# Patient Record
Sex: MALE | Race: WHITE | NOT HISPANIC OR LATINO | URBAN - METROPOLITAN AREA
[De-identification: names, ages, dates, MRNs, and addresses within clinical notes are randomized per-mention and may not be internally consistent; named-entity substitution may affect disease eponyms.]

---

## 2017-02-13 ENCOUNTER — EMERGENCY (EMERGENCY)
Facility: HOSPITAL | Age: 37
LOS: 0 days | Discharge: HOME | End: 2017-02-13
Attending: EMERGENCY MEDICINE | Admitting: EMERGENCY MEDICINE
Payer: COMMERCIAL

## 2017-02-13 VITALS
HEART RATE: 75 BPM | RESPIRATION RATE: 15 BRPM | SYSTOLIC BLOOD PRESSURE: 113 MMHG | TEMPERATURE: 99 F | DIASTOLIC BLOOD PRESSURE: 61 MMHG | OXYGEN SATURATION: 100 %

## 2017-02-13 VITALS — WEIGHT: 199.96 LBS

## 2017-02-13 DIAGNOSIS — M79.1 MYALGIA: ICD-10-CM

## 2017-02-13 DIAGNOSIS — R09.89 OTHER SPECIFIED SYMPTOMS AND SIGNS INVOLVING THE CIRCULATORY AND RESPIRATORY SYSTEMS: ICD-10-CM

## 2017-02-13 LAB
ALBUMIN SERPL ELPH-MCNC: 3.9 G/DL — SIGNIFICANT CHANGE UP (ref 3.3–5)
ALP SERPL-CCNC: 56 U/L — SIGNIFICANT CHANGE UP (ref 40–120)
ALT FLD-CCNC: 23 U/L — SIGNIFICANT CHANGE UP (ref 12–78)
ANION GAP SERPL CALC-SCNC: 6 MMOL/L — SIGNIFICANT CHANGE UP (ref 5–17)
AST SERPL-CCNC: 12 U/L — LOW (ref 15–37)
BASOPHILS # BLD AUTO: 0.1 K/UL — SIGNIFICANT CHANGE UP (ref 0–0.2)
BASOPHILS NFR BLD AUTO: 0.6 % — SIGNIFICANT CHANGE UP (ref 0–2)
BILIRUB SERPL-MCNC: 1.6 MG/DL — HIGH (ref 0.2–1.2)
BUN SERPL-MCNC: 13 MG/DL — SIGNIFICANT CHANGE UP (ref 7–23)
CALCIUM SERPL-MCNC: 8.8 MG/DL — SIGNIFICANT CHANGE UP (ref 8.5–10.1)
CHLORIDE SERPL-SCNC: 102 MMOL/L — SIGNIFICANT CHANGE UP (ref 96–108)
CO2 SERPL-SCNC: 29 MMOL/L — SIGNIFICANT CHANGE UP (ref 22–31)
CREAT SERPL-MCNC: 1.2 MG/DL — SIGNIFICANT CHANGE UP (ref 0.5–1.3)
EOSINOPHIL # BLD AUTO: 0 K/UL — SIGNIFICANT CHANGE UP (ref 0–0.5)
EOSINOPHIL NFR BLD AUTO: 0.3 % — SIGNIFICANT CHANGE UP (ref 0–6)
GLUCOSE SERPL-MCNC: 89 MG/DL — SIGNIFICANT CHANGE UP (ref 70–99)
HCT VFR BLD CALC: 41.7 % — SIGNIFICANT CHANGE UP (ref 39–50)
HGB BLD-MCNC: 15 G/DL — SIGNIFICANT CHANGE UP (ref 13–17)
LYMPHOCYTES # BLD AUTO: 17.1 % — SIGNIFICANT CHANGE UP (ref 13–44)
LYMPHOCYTES # BLD AUTO: 2.5 K/UL — SIGNIFICANT CHANGE UP (ref 1–3.3)
MCHC RBC-ENTMCNC: 32.1 PG — SIGNIFICANT CHANGE UP (ref 27–34)
MCHC RBC-ENTMCNC: 36.1 GM/DL — HIGH (ref 32–36)
MCV RBC AUTO: 89 FL — SIGNIFICANT CHANGE UP (ref 80–100)
MONOCYTES # BLD AUTO: 1 K/UL — HIGH (ref 0–0.9)
MONOCYTES NFR BLD AUTO: 7.3 % — SIGNIFICANT CHANGE UP (ref 2–14)
NEUTROPHILS # BLD AUTO: 10.7 K/UL — HIGH (ref 1.8–7.4)
NEUTROPHILS NFR BLD AUTO: 74.7 % — SIGNIFICANT CHANGE UP (ref 43–77)
PLATELET # BLD AUTO: 246 K/UL — SIGNIFICANT CHANGE UP (ref 150–400)
POTASSIUM SERPL-MCNC: 3.9 MMOL/L — SIGNIFICANT CHANGE UP (ref 3.5–5.3)
POTASSIUM SERPL-SCNC: 3.9 MMOL/L — SIGNIFICANT CHANGE UP (ref 3.5–5.3)
PROT SERPL-MCNC: 7.5 GM/DL — SIGNIFICANT CHANGE UP (ref 6–8.3)
RBC # BLD: 4.69 M/UL — SIGNIFICANT CHANGE UP (ref 4.2–5.8)
RBC # FLD: 10.8 % — SIGNIFICANT CHANGE UP (ref 10.3–14.5)
SODIUM SERPL-SCNC: 137 MMOL/L — SIGNIFICANT CHANGE UP (ref 135–145)
WBC # BLD: 14.3 K/UL — HIGH (ref 3.8–10.5)
WBC # FLD AUTO: 14.3 K/UL — HIGH (ref 3.8–10.5)

## 2017-02-13 PROCEDURE — 99284 EMERGENCY DEPT VISIT MOD MDM: CPT

## 2017-02-13 PROCEDURE — 71020: CPT | Mod: 26

## 2017-02-13 RX ORDER — SODIUM CHLORIDE 9 MG/ML
1000 INJECTION INTRAMUSCULAR; INTRAVENOUS; SUBCUTANEOUS
Qty: 0 | Refills: 0 | Status: DISCONTINUED | OUTPATIENT
Start: 2017-02-13 | End: 2017-02-13

## 2017-02-13 RX ORDER — AZITHROMYCIN 500 MG/1
1 TABLET, FILM COATED ORAL
Qty: 4 | Refills: 0 | OUTPATIENT
Start: 2017-02-13 | End: 2017-02-17

## 2017-02-13 RX ORDER — SODIUM CHLORIDE 9 MG/ML
1000 INJECTION INTRAMUSCULAR; INTRAVENOUS; SUBCUTANEOUS ONCE
Qty: 0 | Refills: 0 | Status: COMPLETED | OUTPATIENT
Start: 2017-02-13 | End: 2017-02-13

## 2017-02-13 RX ORDER — AZITHROMYCIN 500 MG/1
500 TABLET, FILM COATED ORAL ONCE
Qty: 0 | Refills: 0 | Status: COMPLETED | OUTPATIENT
Start: 2017-02-13 | End: 2017-02-13

## 2017-02-13 RX ADMIN — SODIUM CHLORIDE 1000 MILLILITER(S): 9 INJECTION INTRAMUSCULAR; INTRAVENOUS; SUBCUTANEOUS at 17:19

## 2017-02-13 RX ADMIN — AZITHROMYCIN 500 MILLIGRAM(S): 500 TABLET, FILM COATED ORAL at 17:18

## 2017-02-13 NOTE — ED STATDOCS - PROGRESS NOTE DETAILS
male presents to ED with fever chills muscle aches and joint pain onset The history, relevant review of systems, past medical and surgical history, medical decision making, and physical examination was documented by the scribe in my presence and I attest to the accuracy of the documentation.

## 2017-02-13 NOTE — ED STATDOCS - ATTENDING CONTRIBUTION TO CARE
I personally saw the patient with the NP, and completed the key components of the history and physical exam. I then discussed the management plan with the NP.

## 2017-02-13 NOTE — ED STATDOCS - OBJECTIVE STATEMENT
35 y/o male with no PMHx, c/o fevers, chills and sweats over the passed few days. Fevers tmax 102.5. Pt has been placed on tamiflu and amoxicillin. Pt reports he has been coughing up dark greek sputum today. Non-smoker. Pt smokes marijuana on occasion.

## 2017-04-17 ENCOUNTER — EMERGENCY (EMERGENCY)
Facility: HOSPITAL | Age: 37
LOS: 0 days | Discharge: HOME | End: 2017-04-18
Attending: EMERGENCY MEDICINE | Admitting: EMERGENCY MEDICINE
Payer: COMMERCIAL

## 2017-04-17 VITALS
RESPIRATION RATE: 16 BRPM | HEART RATE: 65 BPM | SYSTOLIC BLOOD PRESSURE: 128 MMHG | DIASTOLIC BLOOD PRESSURE: 73 MMHG | TEMPERATURE: 99 F | OXYGEN SATURATION: 98 %

## 2017-04-17 VITALS — HEIGHT: 74 IN | WEIGHT: 199.96 LBS

## 2017-04-17 LAB
ALBUMIN SERPL ELPH-MCNC: 4 G/DL — SIGNIFICANT CHANGE UP (ref 3.3–5)
ALP SERPL-CCNC: 60 U/L — SIGNIFICANT CHANGE UP (ref 40–120)
ALT FLD-CCNC: 40 U/L — SIGNIFICANT CHANGE UP (ref 12–78)
ANION GAP SERPL CALC-SCNC: 4 MMOL/L — LOW (ref 5–17)
APPEARANCE UR: CLEAR — SIGNIFICANT CHANGE UP
AST SERPL-CCNC: 23 U/L — SIGNIFICANT CHANGE UP (ref 15–37)
BASOPHILS # BLD AUTO: 0.1 K/UL — SIGNIFICANT CHANGE UP (ref 0–0.2)
BASOPHILS NFR BLD AUTO: 1.3 % — SIGNIFICANT CHANGE UP (ref 0–2)
BILIRUB SERPL-MCNC: 0.7 MG/DL — SIGNIFICANT CHANGE UP (ref 0.2–1.2)
BILIRUB UR-MCNC: NEGATIVE — SIGNIFICANT CHANGE UP
BUN SERPL-MCNC: 18 MG/DL — SIGNIFICANT CHANGE UP (ref 7–23)
CALCIUM SERPL-MCNC: 8.9 MG/DL — SIGNIFICANT CHANGE UP (ref 8.5–10.1)
CHLORIDE SERPL-SCNC: 108 MMOL/L — SIGNIFICANT CHANGE UP (ref 96–108)
CO2 SERPL-SCNC: 30 MMOL/L — SIGNIFICANT CHANGE UP (ref 22–31)
COLOR SPEC: YELLOW — SIGNIFICANT CHANGE UP
CREAT SERPL-MCNC: 1.32 MG/DL — HIGH (ref 0.5–1.3)
DIFF PNL FLD: NEGATIVE — SIGNIFICANT CHANGE UP
EOSINOPHIL # BLD AUTO: 0.4 K/UL — SIGNIFICANT CHANGE UP (ref 0–0.5)
EOSINOPHIL NFR BLD AUTO: 4.3 % — SIGNIFICANT CHANGE UP (ref 0–6)
GLUCOSE SERPL-MCNC: 84 MG/DL — SIGNIFICANT CHANGE UP (ref 70–99)
GLUCOSE UR QL: NEGATIVE MG/DL — SIGNIFICANT CHANGE UP
HCT VFR BLD CALC: 45.3 % — SIGNIFICANT CHANGE UP (ref 39–50)
HGB BLD-MCNC: 14.8 G/DL — SIGNIFICANT CHANGE UP (ref 13–17)
KETONES UR-MCNC: NEGATIVE — SIGNIFICANT CHANGE UP
LEUKOCYTE ESTERASE UR-ACNC: NEGATIVE — SIGNIFICANT CHANGE UP
LIDOCAIN IGE QN: 305 U/L — SIGNIFICANT CHANGE UP (ref 73–393)
LYMPHOCYTES # BLD AUTO: 3.2 K/UL — SIGNIFICANT CHANGE UP (ref 1–3.3)
LYMPHOCYTES # BLD AUTO: 38.8 % — SIGNIFICANT CHANGE UP (ref 13–44)
MCHC RBC-ENTMCNC: 29.5 PG — SIGNIFICANT CHANGE UP (ref 27–34)
MCHC RBC-ENTMCNC: 32.6 GM/DL — SIGNIFICANT CHANGE UP (ref 32–36)
MCV RBC AUTO: 90.6 FL — SIGNIFICANT CHANGE UP (ref 80–100)
MONOCYTES # BLD AUTO: 0.5 K/UL — SIGNIFICANT CHANGE UP (ref 0–0.9)
MONOCYTES NFR BLD AUTO: 6.4 % — SIGNIFICANT CHANGE UP (ref 2–14)
NEUTROPHILS # BLD AUTO: 4.1 K/UL — SIGNIFICANT CHANGE UP (ref 1.8–7.4)
NEUTROPHILS NFR BLD AUTO: 49.3 % — SIGNIFICANT CHANGE UP (ref 43–77)
NITRITE UR-MCNC: NEGATIVE — SIGNIFICANT CHANGE UP
PH UR: 7 — SIGNIFICANT CHANGE UP (ref 4.8–8)
PLATELET # BLD AUTO: 267 K/UL — SIGNIFICANT CHANGE UP (ref 150–400)
POTASSIUM SERPL-MCNC: 4.1 MMOL/L — SIGNIFICANT CHANGE UP (ref 3.5–5.3)
POTASSIUM SERPL-SCNC: 4.1 MMOL/L — SIGNIFICANT CHANGE UP (ref 3.5–5.3)
PROT SERPL-MCNC: 7 GM/DL — SIGNIFICANT CHANGE UP (ref 6–8.3)
PROT UR-MCNC: NEGATIVE MG/DL — SIGNIFICANT CHANGE UP
RBC # BLD: 5 M/UL — SIGNIFICANT CHANGE UP (ref 4.2–5.8)
RBC # FLD: 11.6 % — SIGNIFICANT CHANGE UP (ref 10.3–14.5)
SODIUM SERPL-SCNC: 142 MMOL/L — SIGNIFICANT CHANGE UP (ref 135–145)
SP GR SPEC: 1.01 — SIGNIFICANT CHANGE UP (ref 1.01–1.02)
UROBILINOGEN FLD QL: NEGATIVE MG/DL — SIGNIFICANT CHANGE UP
WBC # BLD: 8.3 K/UL — SIGNIFICANT CHANGE UP (ref 3.8–10.5)
WBC # FLD AUTO: 8.3 K/UL — SIGNIFICANT CHANGE UP (ref 3.8–10.5)

## 2017-04-17 PROCEDURE — 74177 CT ABD & PELVIS W/CONTRAST: CPT | Mod: 26

## 2017-04-17 PROCEDURE — 99285 EMERGENCY DEPT VISIT HI MDM: CPT

## 2017-04-17 RX ORDER — SODIUM CHLORIDE 9 MG/ML
3 INJECTION INTRAMUSCULAR; INTRAVENOUS; SUBCUTANEOUS ONCE
Qty: 0 | Refills: 0 | Status: COMPLETED | OUTPATIENT
Start: 2017-04-17 | End: 2017-04-17

## 2017-04-17 RX ADMIN — SODIUM CHLORIDE 3 MILLILITER(S): 9 INJECTION INTRAMUSCULAR; INTRAVENOUS; SUBCUTANEOUS at 22:00

## 2017-04-17 NOTE — ED STATDOCS - ATTENDING CONTRIBUTION TO CARE
I, Charley Clark, performed the initial face to face bedside interview with this patient regarding history of present illness, review of symptoms and relevant past medical, social and family history.  I completed an independent physical examination.  I was the initial provider who evaluated this patient. I have signed out the follow up of any pending tests (i.e. labs, radiological studies) to the ACP with instructions to review any pertinent findings to me prior to determining a final disposition.  I have communicated the patient’s plan of care and disposition with the ACP.  The history, relevant review of systems, past medical and surgical history, medical decision making, and physical examination was documented by the scribe in my presence and I attest to the accuracy of the documentation.

## 2017-04-17 NOTE — ED STATDOCS - OBJECTIVE STATEMENT
37 y/o M presents to the ED c/o abd pain. The pt provides that he has been having intermittent LLQ pain since 3 days and has become constant today. No h/o fever, chills, dizziness, headache, nvd, cp, cough, sob, or dysuria.

## 2017-04-17 NOTE — ED STATDOCS - PROGRESS NOTE DETAILS
BLESSING Green:   Patient has been seen, evaluated and orders have been written by the attending in intake. Patient is stable.  I will follow up the results of orders written and I will continue to evaluate/observe the patient.  Adali Green PA-C BLESSING Green:   Patient has been seen, evaluated and orders have been written by the attending in intake. Patient is stable.  I will follow up the results of orders written and I will continue to evaluate/observe the patient.  Adali Green PA-C  Awaiting for UA to order CT. Pt. reported ot be eating dinner in treatment area.  Pt. told to stop eating.  Adali Green PA-C spoke with radiollogist pt with epiploic appendagitis pt wants to leave, pt encouraged to stay

## 2017-04-17 NOTE — ED STATDOCS - CHPI ED SYMPTOM NEG
no palpitations/no abdominal distention/no dysuria/no blood in stool/no vomiting/no diarrhea/no burning urination/no nausea/no fever/no hematuria

## 2017-04-17 NOTE — ED STATDOCS - NS ED MD SCRIBE ATTENDING SCRIBE SECTIONS
PAST MEDICAL/SURGICAL/SOCIAL HISTORY/RESULTS/REVIEW OF SYSTEMS/DISPOSITION/INTAKE ASSESSMENT/SCREENINGS/PHYSICAL EXAM/CONSULTATIONS/SHIFT CHANGE/HISTORY OF PRESENT ILLNESS/HIV/PROGRESS NOTE

## 2017-04-17 NOTE — ED ADULT NURSE REASSESSMENT NOTE - NS ED NURSE REASSESS COMMENT FT1
Pt is a 37 y/o A&O x 4 male presents to ED c/o LLQ abd pain, denies nausea, vomiting, fever or chills. Appears in NAD. SL initiated, labs drawn. Urine specimen obtained. Will continue to monitor.

## 2017-04-19 DIAGNOSIS — R10.32 LEFT LOWER QUADRANT PAIN: ICD-10-CM

## 2017-04-19 DIAGNOSIS — K63.89 OTHER SPECIFIED DISEASES OF INTESTINE: ICD-10-CM

## 2017-05-23 ENCOUNTER — TRANSCRIPTION ENCOUNTER (OUTPATIENT)
Age: 37
End: 2017-05-23

## 2017-05-31 PROBLEM — Z00.00 ENCOUNTER FOR PREVENTIVE HEALTH EXAMINATION: Noted: 2017-05-31

## 2017-06-01 ENCOUNTER — APPOINTMENT (OUTPATIENT)
Dept: ORTHOPEDIC SURGERY | Facility: CLINIC | Age: 37
End: 2017-06-01

## 2017-06-01 VITALS — BODY MASS INDEX: 27.09 KG/M2 | HEIGHT: 72 IN | WEIGHT: 200 LBS

## 2017-06-01 DIAGNOSIS — Z82.49 FAMILY HISTORY OF ISCHEMIC HEART DISEASE AND OTHER DISEASES OF THE CIRCULATORY SYSTEM: ICD-10-CM

## 2017-06-01 DIAGNOSIS — Z78.9 OTHER SPECIFIED HEALTH STATUS: ICD-10-CM

## 2017-06-01 DIAGNOSIS — Z83.3 FAMILY HISTORY OF DIABETES MELLITUS: ICD-10-CM

## 2017-06-01 RX ORDER — DIAZEPAM 5 MG/1
TABLET ORAL
Refills: 0 | Status: ACTIVE | COMMUNITY

## 2017-07-11 ENCOUNTER — APPOINTMENT (OUTPATIENT)
Dept: ORTHOPEDIC SURGERY | Facility: CLINIC | Age: 37
End: 2017-07-11

## 2017-08-28 ENCOUNTER — OTHER (OUTPATIENT)
Age: 37
End: 2017-08-28

## 2017-09-08 ENCOUNTER — APPOINTMENT (OUTPATIENT)
Dept: ORTHOPEDIC SURGERY | Facility: CLINIC | Age: 37
End: 2017-09-08
Payer: COMMERCIAL

## 2017-09-08 PROCEDURE — 73140 X-RAY EXAM OF FINGER(S): CPT | Mod: FA

## 2017-09-08 PROCEDURE — 99214 OFFICE O/P EST MOD 30 MIN: CPT

## 2018-02-13 ENCOUNTER — TRANSCRIPTION ENCOUNTER (OUTPATIENT)
Age: 38
End: 2018-02-13

## 2018-05-17 ENCOUNTER — OTHER (OUTPATIENT)
Age: 38
End: 2018-05-17

## 2018-06-05 ENCOUNTER — APPOINTMENT (OUTPATIENT)
Dept: ORTHOPEDIC SURGERY | Facility: CLINIC | Age: 38
End: 2018-06-05
Payer: COMMERCIAL

## 2018-06-05 VITALS — HEIGHT: 74 IN | WEIGHT: 200 LBS | RESPIRATION RATE: 16 BRPM | BODY MASS INDEX: 25.67 KG/M2

## 2018-06-05 DIAGNOSIS — M79.644 PAIN IN RIGHT FINGER(S): ICD-10-CM

## 2018-06-05 DIAGNOSIS — M20.012 MALLET FINGER OF LEFT FINGER(S): ICD-10-CM

## 2018-06-05 PROCEDURE — 20600 DRAIN/INJ JOINT/BURSA W/O US: CPT | Mod: LT

## 2018-06-05 PROCEDURE — 99214 OFFICE O/P EST MOD 30 MIN: CPT | Mod: 25

## 2019-07-14 ENCOUNTER — TRANSCRIPTION ENCOUNTER (OUTPATIENT)
Age: 39
End: 2019-07-14

## 2020-02-01 ENCOUNTER — TRANSCRIPTION ENCOUNTER (OUTPATIENT)
Age: 40
End: 2020-02-01

## 2020-08-26 ENCOUNTER — EMERGENCY (EMERGENCY)
Facility: HOSPITAL | Age: 40
LOS: 0 days | Discharge: HOME | End: 2020-08-27
Attending: EMERGENCY MEDICINE
Payer: COMMERCIAL

## 2020-08-26 VITALS — WEIGHT: 199.96 LBS | HEIGHT: 74 IN

## 2020-08-26 VITALS
SYSTOLIC BLOOD PRESSURE: 121 MMHG | RESPIRATION RATE: 14 BRPM | OXYGEN SATURATION: 97 % | DIASTOLIC BLOOD PRESSURE: 81 MMHG | HEART RATE: 65 BPM | TEMPERATURE: 98 F

## 2020-08-26 DIAGNOSIS — R51 HEADACHE: ICD-10-CM

## 2020-08-26 PROCEDURE — 99284 EMERGENCY DEPT VISIT MOD MDM: CPT | Mod: 25

## 2020-08-26 PROCEDURE — 70450 CT HEAD/BRAIN W/O DYE: CPT | Mod: 26

## 2020-08-26 PROCEDURE — 93010 ELECTROCARDIOGRAM REPORT: CPT

## 2020-08-26 PROCEDURE — 70450 CT HEAD/BRAIN W/O DYE: CPT

## 2020-08-26 PROCEDURE — 93005 ELECTROCARDIOGRAM TRACING: CPT

## 2020-08-26 PROCEDURE — 99283 EMERGENCY DEPT VISIT LOW MDM: CPT

## 2020-08-26 NOTE — ED ADULT TRIAGE NOTE - CHIEF COMPLAINT QUOTE
Pt presents to ED c/o pain to back of head. Pt states he woke up with pain, denies injury to area. Denies blurry vision, lightheadedness, dizziness, chest pain, sob. PELLETIER 0

## 2020-08-26 NOTE — ED STATDOCS - ATTENDING CONTRIBUTION TO CARE
Attending Contribution to Care: I, Charley Barnes, performed the initial face to face bedside interview with this patient regarding history of present illness, review of symptoms and relevant past medical, social and family history.  I completed an independent physical examination.  I was the initial provider who evaluated this patient and the history, physical, and MDM reflect this intial assessment. I have signed out the follow up of any pending tests after the original (i.e. labs, radiological studies) to the ACP with instructions to review any with instructions to review any concerning findings to me prior to discharge.  I have communicated the patient’s plan of care and disposition with the ACP.

## 2020-08-26 NOTE — ED STATDOCS - PHYSICAL EXAMINATION
PA NOTE: GEN: AOX3, NAD. HEENT: Throat clear. Airway is patent. EYES: PERRLA. EOMI. Head: NC/AT. NECK: Supple, No JVD. FROM. C-spine non-tender. CV:S1S2, RRR, LUNGS: Non-labored breathing, no tachypnea. O2sat 100% RA. CTA b/l. No w/r/r. CHEST: Equal chest expansion and rise. No deformity. ABD: Soft, NT/ND, no rebound, no guarding. No CVAT. EXT: No e/c/c. 2+ distal pulses. SKIN: No rashes. NEURO: No focal deficits. CN II-XII intact. FROM. 5/5 motor and sensory. ~Christiano Reyes PA-C

## 2020-08-26 NOTE — ED STATDOCS - PATIENT PORTAL LINK FT
You can access the FollowMyHealth Patient Portal offered by [facility] by registering at the following website: [email or web address]. By joining [employer]’s FollowMyHealth portal, you will also be able to view your health information using other applications (apps) compatible with our system.

## 2020-08-26 NOTE — ED STATDOCS - CLINICAL SUMMARY MEDICAL DECISION MAKING FREE TEXT BOX
CT head NEG. PA note: CT result reviewed in details with patient. Patient re-examined and re-evaluated. Patient feels much better at this time. ED evaluation, Diagnosis and management discussed with the patient in detail. Workup results discussed with ED attending CANDIDO Matos to [place] home. Close PMD follow up encouraged.  Strict ED return instructions discussed in detail and patient given the opportunity to ask any questions about their discharge diagnosis and instructions. Patient verbalized understanding. ~ Christiano Reyes PA-C

## 2020-08-26 NOTE — ED STATDOCS - NSFOLLOWUPINSTRUCTIONS_ED_ALL_ED_FT
General Headache Without Cause  A headache is pain or discomfort felt around the head or neck area. The specific cause of a headache may not be found. There are many causes and types of headaches. A few common ones are:  Tension headaches.Migraine headaches.Cluster headaches.Chronic daily headaches.Follow these instructions at home:  Watch your condition for any changes. Let your health care provider know about them. Take these steps to help with your condition:  Managing pain         Take over-the-counter and prescription medicines only as told by your health care provider.Lie down in a dark, quiet room when you have a headache.If directed, put ice on your head and neck area:  Put ice in a plastic bag.Place a towel between your skin and the bag.Leave the ice on for 20 minutes, 2–3 times per day.If directed, apply heat to the affected area. Use the heat source that your health care provider recommends, such as a moist heat pack or a heating pad.  Place a towel between your skin and the heat source.Leave the heat on for 20–30 minutes.Remove the heat if your skin turns bright red. This is especially important if you are unable to feel pain, heat, or cold. You may have a greater risk of getting burned.Keep lights dim if bright lights bother you or make your headaches worse.Eating and drinking     Eat meals on a regular schedule.If you drink alcohol:  Limit how much you use to:   0–1 drink a day for women. 0–2 drinks a day for men. Be aware of how much alcohol is in your drink. In the [place] one drink equals one 12 oz bottle of beer (355 mL), one 5 oz glass of wine (148 mL), or one 1½ oz glass of hard liquor (44 mL).Stop drinking caffeine, or decrease the amount of caffeine you drink.General instructions        Keep a headache journal to help find out what may trigger your headaches. For example, write down:  What you eat and drink.How much sleep you get.Any change to your diet or medicines.Try massage or other relaxation techniques.Limit stress.Sit up straight, and do not tense your muscles.Do not use any products that contain nicotine or tobacco, such as cigarettes, e-cigarettes, and chewing tobacco. If you need help quitting, ask your health care provider.Exercise regularly as told by your health care provider.Sleep on a regular schedule. Get 7–9 hours of sleep each night, or the amount recommended by your health care provider.Keep all follow-up visits as told by your health care provider. This is important.Contact a health care provider if:  Your symptoms are not helped by medicine.You have a headache that is different from the usual headache.You have nausea or you vomit.You have a fever.Get help right away if:  Your headache becomes severe quickly.Your headache gets worse after moderate to intense physical activity.You have repeated vomiting.You have a stiff neck.You have a loss of vision.You have problems with speech.You have pain in the eye or ear.You have muscular weakness or loss of muscle control.You lose your balance or have trouble walking.You feel faint or pass out.You have confusion.You have a seizure.Summary  A headache is pain or discomfort felt around the head or neck area.There are many causes and types of headaches. In some cases, the cause may not be found.Keep a headache journal to help find out what may trigger your headaches. Watch your condition for any changes. Let your health care provider know about them.Contact a health care provider if you have a headache that is different from the usual headache, or if your symptoms are not helped by medicine.Get help right away if your headache becomes severe, you vomit, you have a loss of vision, you lose your balance, or you have a seizure.This information is not intended to replace advice given to you by your health care provider. Make sure you discuss any questions you have with your health care provider.

## 2020-08-26 NOTE — ED STATDOCS - CARE PROVIDER_API CALL
Chito Dempsey  NEUROLOGY  [address] [place] [address], [address] [place] [address]  Phone: [phone]  Fax: [phone]  Follow Up Time: 1-3 Days

## 2020-08-26 NOTE — ED STATDOCS - PROGRESS NOTE DETAILS
PA note: Patient is a 38 y/o male with no PMHx who presents to ED c/o waking up this morning with a headache posterior on the L side. No N/V, dizziness, visual changes. Took Advil no relief. Called PMD and told to go to ED for CT. Patient does not want any medication for HA, refusing blood tests, just wants a CT head for acute pathology. ~Christiano Reyes PA-C CT head NEG. PA note: CT result reviewed in details with patient. Patient re-examined and re-evaluated. Patient feels much better at this time. ED evaluation, Diagnosis and management discussed with the patient in detail. Workup results discussed with ED attending CANDIDO Matos to [place] home. Close PMD follow up encouraged.  Strict ED return instructions discussed in detail and patient given the opportunity to ask any questions about their discharge diagnosis and instructions. Patient verbalized understanding. ~ Christiano Reyes PA-C

## 2020-08-27 NOTE — ED ADULT NURSE NOTE - OBJECTIVE STATEMENT
Pt presents to ER c/o left sided headache. Pt woke up with Mathews this evening. Denies change in vision. Gait stable, neuro intact. AO x oriented to baseline, normal breathing pattern with no difficulty. Denies fall/injury

## 2021-08-20 ENCOUNTER — EMERGENCY (EMERGENCY)
Facility: HOSPITAL | Age: 41
LOS: 0 days | Discharge: HOME | End: 2021-08-20
Attending: EMERGENCY MEDICINE
Payer: COMMERCIAL

## 2021-08-20 VITALS — WEIGHT: 199.96 LBS | HEIGHT: 74 IN

## 2021-08-20 VITALS
OXYGEN SATURATION: 100 % | SYSTOLIC BLOOD PRESSURE: 129 MMHG | DIASTOLIC BLOOD PRESSURE: 74 MMHG | HEART RATE: 79 BPM | RESPIRATION RATE: 20 BRPM | TEMPERATURE: 98 F

## 2021-08-20 DIAGNOSIS — Z86.16 PERSONAL HISTORY OF COVID-19: ICD-10-CM

## 2021-08-20 DIAGNOSIS — R06.02 SHORTNESS OF BREATH: ICD-10-CM

## 2021-08-20 DIAGNOSIS — I26.99 OTHER PULMONARY EMBOLISM WITHOUT ACUTE COR PULMONALE: ICD-10-CM

## 2021-08-20 DIAGNOSIS — K64.4 RESIDUAL HEMORRHOIDAL SKIN TAGS: ICD-10-CM

## 2021-08-20 DIAGNOSIS — R07.9 CHEST PAIN, UNSPECIFIED: ICD-10-CM

## 2021-08-20 LAB
ALBUMIN SERPL ELPH-MCNC: 3.1 G/DL — LOW (ref 3.3–5)
ALP SERPL-CCNC: 72 U/L — SIGNIFICANT CHANGE UP (ref 40–120)
ALT FLD-CCNC: 39 U/L — SIGNIFICANT CHANGE UP (ref 12–78)
ANION GAP SERPL CALC-SCNC: 5 MMOL/L — SIGNIFICANT CHANGE UP (ref 5–17)
APTT BLD: 34.9 SEC — SIGNIFICANT CHANGE UP (ref 27.5–35.5)
AST SERPL-CCNC: 28 U/L — SIGNIFICANT CHANGE UP (ref 15–37)
BASOPHILS # BLD AUTO: 0.06 K/UL — SIGNIFICANT CHANGE UP (ref 0–0.2)
BASOPHILS NFR BLD AUTO: 0.8 % — SIGNIFICANT CHANGE UP (ref 0–2)
BILIRUB SERPL-MCNC: 0.6 MG/DL — SIGNIFICANT CHANGE UP (ref 0.2–1.2)
BUN SERPL-MCNC: 12 MG/DL — SIGNIFICANT CHANGE UP (ref 7–23)
CALCIUM SERPL-MCNC: 9 MG/DL — SIGNIFICANT CHANGE UP (ref 8.5–10.1)
CHLORIDE SERPL-SCNC: 107 MMOL/L — SIGNIFICANT CHANGE UP (ref 96–108)
CK SERPL-CCNC: 90 U/L — SIGNIFICANT CHANGE UP (ref 26–308)
CO2 SERPL-SCNC: 28 MMOL/L — SIGNIFICANT CHANGE UP (ref 22–31)
CREAT SERPL-MCNC: 1.08 MG/DL — SIGNIFICANT CHANGE UP (ref 0.5–1.3)
D DIMER BLD IA.RAPID-MCNC: 3249 NG/ML DDU — HIGH
EOSINOPHIL # BLD AUTO: 0.09 K/UL — SIGNIFICANT CHANGE UP (ref 0–0.5)
EOSINOPHIL NFR BLD AUTO: 1.1 % — SIGNIFICANT CHANGE UP (ref 0–6)
GLUCOSE SERPL-MCNC: 89 MG/DL — SIGNIFICANT CHANGE UP (ref 70–99)
HCT VFR BLD CALC: 40.5 % — SIGNIFICANT CHANGE UP (ref 39–50)
HGB BLD-MCNC: 13.7 G/DL — SIGNIFICANT CHANGE UP (ref 13–17)
IMM GRANULOCYTES NFR BLD AUTO: 0.4 % — SIGNIFICANT CHANGE UP (ref 0–1.5)
INR BLD: 1.21 RATIO — HIGH (ref 0.88–1.16)
LACTATE SERPL-SCNC: 0.9 MMOL/L — SIGNIFICANT CHANGE UP (ref 0.7–2)
LIDOCAIN IGE QN: 112 U/L — SIGNIFICANT CHANGE UP (ref 73–393)
LYMPHOCYTES # BLD AUTO: 1.55 K/UL — SIGNIFICANT CHANGE UP (ref 1–3.3)
LYMPHOCYTES # BLD AUTO: 19.6 % — SIGNIFICANT CHANGE UP (ref 13–44)
MCHC RBC-ENTMCNC: 30 PG — SIGNIFICANT CHANGE UP (ref 27–34)
MCHC RBC-ENTMCNC: 33.8 GM/DL — SIGNIFICANT CHANGE UP (ref 32–36)
MCV RBC AUTO: 88.6 FL — SIGNIFICANT CHANGE UP (ref 80–100)
MONOCYTES # BLD AUTO: 0.79 K/UL — SIGNIFICANT CHANGE UP (ref 0–0.9)
MONOCYTES NFR BLD AUTO: 10 % — SIGNIFICANT CHANGE UP (ref 2–14)
NEUTROPHILS # BLD AUTO: 5.4 K/UL — SIGNIFICANT CHANGE UP (ref 1.8–7.4)
NEUTROPHILS NFR BLD AUTO: 68.1 % — SIGNIFICANT CHANGE UP (ref 43–77)
NT-PROBNP SERPL-SCNC: 124 PG/ML — SIGNIFICANT CHANGE UP (ref 0–125)
PLATELET # BLD AUTO: 369 K/UL — SIGNIFICANT CHANGE UP (ref 150–400)
POTASSIUM SERPL-MCNC: 4.6 MMOL/L — SIGNIFICANT CHANGE UP (ref 3.5–5.3)
POTASSIUM SERPL-SCNC: 4.6 MMOL/L — SIGNIFICANT CHANGE UP (ref 3.5–5.3)
PROT SERPL-MCNC: 7.2 GM/DL — SIGNIFICANT CHANGE UP (ref 6–8.3)
PROTHROM AB SERPL-ACNC: 14 SEC — HIGH (ref 10.6–13.6)
RBC # BLD: 4.57 M/UL — SIGNIFICANT CHANGE UP (ref 4.2–5.8)
RBC # FLD: 12 % — SIGNIFICANT CHANGE UP (ref 10.3–14.5)
SODIUM SERPL-SCNC: 140 MMOL/L — SIGNIFICANT CHANGE UP (ref 135–145)
TROPONIN I SERPL-MCNC: <0.015 NG/ML — SIGNIFICANT CHANGE UP (ref 0.01–0.04)
WBC # BLD: 7.92 K/UL — SIGNIFICANT CHANGE UP (ref 3.8–10.5)
WBC # FLD AUTO: 7.92 K/UL — SIGNIFICANT CHANGE UP (ref 3.8–10.5)

## 2021-08-20 PROCEDURE — 71045 X-RAY EXAM CHEST 1 VIEW: CPT | Mod: 26

## 2021-08-20 PROCEDURE — 93971 EXTREMITY STUDY: CPT | Mod: LT

## 2021-08-20 PROCEDURE — 93005 ELECTROCARDIOGRAM TRACING: CPT

## 2021-08-20 PROCEDURE — G1004: CPT

## 2021-08-20 PROCEDURE — 99284 EMERGENCY DEPT VISIT MOD MDM: CPT | Mod: 25

## 2021-08-20 PROCEDURE — 36415 COLL VENOUS BLD VENIPUNCTURE: CPT

## 2021-08-20 PROCEDURE — 85025 COMPLETE CBC W/AUTO DIFF WBC: CPT

## 2021-08-20 PROCEDURE — 99285 EMERGENCY DEPT VISIT HI MDM: CPT

## 2021-08-20 PROCEDURE — 84145 PROCALCITONIN (PCT): CPT

## 2021-08-20 PROCEDURE — 80053 COMPREHEN METABOLIC PANEL: CPT

## 2021-08-20 PROCEDURE — 86900 BLOOD TYPING SEROLOGIC ABO: CPT

## 2021-08-20 PROCEDURE — 83605 ASSAY OF LACTIC ACID: CPT

## 2021-08-20 PROCEDURE — 83690 ASSAY OF LIPASE: CPT

## 2021-08-20 PROCEDURE — 85730 THROMBOPLASTIN TIME PARTIAL: CPT

## 2021-08-20 PROCEDURE — 84484 ASSAY OF TROPONIN QUANT: CPT

## 2021-08-20 PROCEDURE — 93010 ELECTROCARDIOGRAM REPORT: CPT

## 2021-08-20 PROCEDURE — 83880 ASSAY OF NATRIURETIC PEPTIDE: CPT

## 2021-08-20 PROCEDURE — 85610 PROTHROMBIN TIME: CPT

## 2021-08-20 PROCEDURE — 85379 FIBRIN DEGRADATION QUANT: CPT

## 2021-08-20 PROCEDURE — 93971 EXTREMITY STUDY: CPT | Mod: 26,LT

## 2021-08-20 PROCEDURE — 71045 X-RAY EXAM CHEST 1 VIEW: CPT

## 2021-08-20 PROCEDURE — 82550 ASSAY OF CK (CPK): CPT

## 2021-08-20 PROCEDURE — 71275 CT ANGIOGRAPHY CHEST: CPT | Mod: 26,ME

## 2021-08-20 PROCEDURE — 71275 CT ANGIOGRAPHY CHEST: CPT

## 2021-08-20 PROCEDURE — 86901 BLOOD TYPING SEROLOGIC RH(D): CPT

## 2021-08-20 PROCEDURE — 82728 ASSAY OF FERRITIN: CPT

## 2021-08-20 PROCEDURE — 86140 C-REACTIVE PROTEIN: CPT

## 2021-08-20 PROCEDURE — 86850 RBC ANTIBODY SCREEN: CPT

## 2021-08-20 RX ORDER — ENOXAPARIN SODIUM 100 MG/ML
90 INJECTION SUBCUTANEOUS ONCE
Refills: 0 | Status: DISCONTINUED | OUTPATIENT
Start: 2021-08-20 | End: 2021-08-20

## 2021-08-20 RX ORDER — FONDAPARINUX SODIUM 2.5 MG/.5ML
1 INJECTION, SOLUTION SUBCUTANEOUS
Qty: 42 | Refills: 0
Start: 2021-08-20 | End: 2021-09-09

## 2021-08-20 RX ORDER — RIVAROXABAN 15 MG-20MG
15 KIT ORAL ONCE
Refills: 0 | Status: COMPLETED | OUTPATIENT
Start: 2021-08-20 | End: 2021-08-20

## 2021-08-20 RX ADMIN — RIVAROXABAN 15 MILLIGRAM(S): KIT at 16:44

## 2021-08-20 NOTE — ED PROVIDER NOTE - PROGRESS NOTE DETAILS
Hugh Plunkett for attending Dr. Hayes: Rectal Exam: Small hemorrhoid nonbleeding 6 o clock position. [id number]. Expiration 2/28/22. Light brown stool. Guaiac positive. QC passed. Patient with RLL pulmonary embolism. Discussed findings with patient, although satting 99%, likely provoked PE from COVID, no other high risk factors, and PESI score low, recommended admission to hospital in light of his lower GI bleeding.  Patient declines admission at this time.  We discussed risk and benefits, including worsening disease burden, hypoxia, Gi bleed, death, and patient still requesting discharge at this time.  States that he lives 7 minutes from the hospital and if he has any complications he will immediately come back.  With this shared decision making, we will start oral anticoagulation, f/u with pulmonology, GI, with strict return precautions for hypoxia, SOB, chest pain, or GI bleed. Malcolm Hayes D.O. Had at length discussion with patient, recommending admission for anticoagulation, particularly in setting of rectal bleeding. Pt states he does not want to stay in hospital over concerns of difficulty sleeping. States he lives close to hospital and can return at any time. Also cites appointment with colorectal surgery in 5 days. Risks of bleeding discussed with patient that may result in death, still prefers to go home vs being admitted. Advised patient that in event of discharge from ED, he will be provided temporary starter dose of xarelto, must follow up with PCP to continue x 6 months. Shared decision making with patient, will discharge with return precautions including trauma, bleeding. - Sal Pal PA-C

## 2021-08-20 NOTE — ED PROVIDER NOTE - PATIENT PORTAL LINK FT
You can access the FollowMyHealth Patient Portal offered by [facility] by registering at the following website: [email or web address]. By joining [employer]’s FollowMyHealth portal, you will also be able to view your health information using other applications (apps) compatible with our system.

## 2021-08-20 NOTE — ED PROVIDER NOTE - OBJECTIVE STATEMENT
41 y/o M with PMH of COVID-19, states he tested positive 3 weeks ago, presents now with right sided chest/rib pain and sob worse over the past 3 days. States he is unable to lie down in flat position without feeling pain. Notes pain is worse with deep inspiration. Also with left calf pain. States he developed hemorrhoids during his covid infection. States over the past 2 days has had "large amounts" of bloody stool. Denies fever, chills, abdominal pain, nausea, vomiting.

## 2021-08-20 NOTE — ED PROVIDER NOTE - NSFOLLOWUPINSTRUCTIONS_ED_ALL_ED_FT
PLEASE FOLLOW UP WITH YOUR PCP WITHIN 21 DAYS TO CONTINUE WITH XARELTO. FOLLOW UP WITH GI AS PLANNED. RETURN TO ED IN EVENT OF WORSENING SYMPTOMS.   Pulmonary Embolism    WHAT YOU NEED TO KNOW:    What is a pulmonary embolism (PE)? A PE is the sudden blockage of a blood vessel in the lungs by an embolus. An embolus is a small piece of blood clot, fat, air, or tumor cells. The embolus cuts off the blood supply to your lungs. A PE can become life-threatening.    Pulmonary Embolism         What increases my risk for a PE?   •Obesity      •Smoking cigarettes      •A blood disorder that makes your blood clot faster than normal, such as factor V Leiden mutation      •Birth control pills, especially if you are older than 35 or smoke cigarettes      •Certain blood diseases, such as thrombophilia and hyperhomocysteinemia      •Medical conditions, such as a deep venous thrombosis (DVT) or cancer      •Pregnancy and childbirth      •Recent surgery      •Sitting or lying in one position for a long time, such as when you travel by plane      What are the signs and symptoms of a PE?   •Sudden shortness of breath or fast breathing      •Sudden chest pain that is worse when you take a deep breath      •Fast heartbeat      •Fever and coughing up blood      •Bluish nails      •Cold, pale, clammy skin      •Fainting      How is a PE diagnosed? Ask your healthcare provider about these and other tests you may need:   •Blood tests may show signs of the PE or how well your organs are working.      •An EKG test records your heart rhythm and how fast your heart beats. It is used to check for abnormal heart function.      •A chest x-ray may show signs of a lung infection or other damage.      •A CT scan may show the PE. You may be given contrast liquid to help your lungs show up better in the pictures. Tell the healthcare provider if you have ever had an allergic reaction to contrast liquid.       •A lung scan, or V/Q scan, may show how well blood and oxygen flow in your lungs. A small amount of contrast liquid is used to study your airflow (V) and blood flow (Q). First, you breathe in medical gas. Then, contrast liquid is injected into a vein. Pictures are taken to see how well your lungs take in oxygen.       How is a PE treated? Treatment depends on what the embolus is made of and where the PE is located in your lung. You may need any of the following:   •Clot busters are emergency medicines that work to dissolve blood clots.      •Blood thinners help prevent blood clots. Clots can cause strokes, heart attacks, and death. The following are general safety guidelines to follow while you are taking a blood thinner:?Watch for bleeding and bruising while you take blood thinners. Watch for bleeding from your gums or nose. Watch for blood in your urine and bowel movements. Use a soft washcloth on your skin, and a soft toothbrush to brush your teeth. This can keep your skin and gums from bleeding. If you shave, use an electric shaver. Do not play contact sports.       ?Tell your dentist and other healthcare providers that you take a blood thinner. Wear a bracelet or necklace that says you take this medicine.       ?Do not start or stop any other medicines unless your healthcare provider tells you to. Many medicines cannot be used with blood thinners.      ?Take your blood thinner exactly as prescribed by your healthcare provider. Do not skip does or take less than prescribed. Tell your provider right away if you forget to take your blood thinner, or if you take too much.      ?Warfarin is a blood thinner that you may need to take. The following are things you should be aware of if you take warfarin: ?Foods and medicines can affect the amount of warfarin in your blood. Do not make major changes to your diet while you take warfarin. Warfarin works best when you eat about the same amount of vitamin K every day. Vitamin K is found in green leafy vegetables and certain other foods. Ask for more information about what to eat when you are taking warfarin.      ?You will need to see your healthcare provider for follow-up visits when you are on warfarin. You will need regular blood tests. These tests are used to decide how much medicine you need.         •A vena cava filter may be placed inside your vena cava to prevent another PE. The vena cava is a large vein that brings blood from your lower body up to your heart. The filter may help trap an embolus and prevent it from going into your lungs.      •Surgery called a thrombectomy may be done to remove the PE. A procedure called thrombolysis may instead be done to inject a clot buster that helps break the clot apart.      What can I do to prevent a PE?   •Change your body position or move around often. Move and stretch in your seat several times each hour if you travel by car or work at a desk. In an airplane, get up and walk every hour.      •Exercise regularly to help increase your blood flow. Walking is a good low-impact exercise. Talk to your healthcare provider about the best exercise plan for you.   Walking for Exercise           •Maintain a healthy weight. Ask your healthcare provider how much you should weigh. Ask him or her to help you create a weight loss plan if you are overweight.      •Do not smoke. Nicotine and other chemicals in cigarettes and cigars can damage blood vessels and increase your risk for another PE. Ask your healthcare provider for information if you currently smoke and need help to quit. E-cigarettes or smokeless tobacco still contain nicotine. Talk to your healthcare provider before you use these products.      •Ask about birth control if you are a woman who takes the pill. A birth control pill increases the risk for blood clots in certain women. The risk is higher if you are also older than 35, smoke cigarettes, or have a blood clotting disorder. Talk to your healthcare provider about other ways to prevent pregnancy, such as a cervical cap or intrauterine device (IUD).      Call your local emergency number (911 in the [place]) if:   •You feel lightheaded, short of breath, and have chest pain.      •You cough up blood.      When should I call my doctor?   •Your arm or leg feels warm, tender, and painful. It may look swollen and red.      •You have questions or concerns about your condition or care.      CARE AGREEMENT:    You have the right to help plan your care. Learn about your health condition and how it may be treated. Discuss treatment options with your healthcare providers to decide what care you want to receive. You always have the right to refuse treatment.

## 2021-08-20 NOTE — ED ADULT NURSE NOTE - NS_ED_NURSE_TEACHING_TOPIC_ED_A_ED
pulmonary embolism. Patient advised of the seriousness of taking xarelto and following up with pcp. Advised that if symptoms worsen to come back

## 2021-08-20 NOTE — ED PROVIDER NOTE - NS ED ROS FT
GEN: Denies fever, chills, sick contacts, recent travel  HEENT: Denies dizziness, blurry vision, HA  Cardiac: +CP and SOB Denies palpitations  Lungs: Denies cough, wheezing  PV: +left calf pain Denies LE swelling  GI: Denies nausea, vomiting, diarrhea, constipation, flank pain  : Denies hematuria, dysuria, frequency, urgency  Skin: Denies rash, redness, open wound  MSK: Denies pain, decreased ROM  Neuro: Denies dizziness, difficulty speaking, difficulty swallowing, numbness/tingling in extremities, loss of bowel/bladder control, weakness in extremities

## 2021-08-20 NOTE — ED PROVIDER NOTE - PHYSICAL EXAMINATION
Gen: Well appearing. A&O x3.   HEENT: NC/AT. Dentition in good repair. No oropharyngeal erythema, tonsilar enlargement or exudates. Uvula midline. No submandibular or anterior cervical lymphadenopathy. TM well visualized bilaterally. Nares patent.  Cardiac: s1s2, RRR.  Lungs: CTAB, no chest wall tenderness.  Abdomen: NBS x4, soft, nontender. No CVAT.  MSK: No obvious deformity to extremities. No midline spinal tenderness or tenderness over bony landmarks on extremities. 5/5 upper and lower extremity strength. Full ROM in all extremities  Neuro: CN II-XII intact. Sensation intact to light touch in all extremities. 5/5 strength in all extremities. Romberg negative.  PV: No LE edema or calf tenderness. Distal pulses 2+ in all extremities.

## 2021-08-20 NOTE — ED STATDOCS - NS_ ATTENDINGSCRIBEDETAILS _ED_A_ED_FT
I, Janak Munoz MD,  performed the initial face to face bedside interview with this patient regarding history of present illness, review of symptoms and relevant past medical, social and family history.  I completed an independent physical examination.  I was the initial provider who evaluated this patient.  The history, relevant review of systems, past medical and surgical history, medical decision making, and physical examination was documented by the scribe in my presence and I attest to the accuracy of the documentation.

## 2021-08-20 NOTE — ED STATDOCS - PROGRESS NOTE DETAILS
Hugh Munoz : 41 y/o M with no significant PMHx presents to the ED c/o SOB. Recently diagnosed with COVID 1 month ago. Will send pt to main ED for further evaluation. Hugh Pickering for Dr. CR Munoz : 41 y/o M with no significant PMHx presents to the ED c/o SOB.Recently diagnosed with COVID 1 month ago. now with right sided cp sob  leg swelling on left as well as pain. also had large volume of blood per rectum. has had hemorrhoids before but states he passed a lot of blood. here pt hypotensive to 90 systolic. Will send pt to main ED for further evaluation.

## 2021-08-20 NOTE — ED ADULT TRIAGE NOTE - CHIEF COMPLAINT QUOTE
Pt. to the ED C/O Right sided Rib Pain with SOB and CP- Pt. reports SOB worst at lying down- Pt. reports being Covid + x 2 weeks

## 2021-08-20 NOTE — ED PROVIDER NOTE - CLINICAL SUMMARY MEDICAL DECISION MAKING FREE TEXT BOX
Pt with PE, GI bleed likely 2/2 hemorrhoid.  H/H stable.  Discussed with patient recommendation to be admitted to hospital for close observation while initially starting blood thinners given GI bleed, however patient declines.  D/c home on anticoagulation, follow up and strict return precautions given.

## 2021-08-21 LAB
CRP SERPL-MCNC: 56 MG/L — HIGH
FERRITIN SERPL-MCNC: 290 NG/ML — SIGNIFICANT CHANGE UP (ref 30–400)
PROCALCITONIN SERPL-MCNC: 0.02 NG/ML — SIGNIFICANT CHANGE UP (ref 0.02–0.1)

## 2021-09-04 ENCOUNTER — TRANSCRIPTION ENCOUNTER (OUTPATIENT)
Age: 41
End: 2021-09-04

## 2022-02-26 NOTE — ED STATDOCS - OBJECTIVE STATEMENT
Lab Results   Component Value Date    HGBA1C 7 7 (H) 09/03/2021       Recent Labs     02/25/22  1240 02/25/22  1614 02/25/22  2104 02/26/22  1115   POCGLU 127 105 84 96     Continue Lantus with sliding scale insulin 38 y/o male woke up with a headache posterior on the L side. No N/V, dizziness, visual changes. Took Advil no relief. Called PMD and told to go to ED for CT.

## 2022-02-28 ENCOUNTER — APPOINTMENT (OUTPATIENT)
Dept: ORTHOPEDIC SURGERY | Facility: CLINIC | Age: 42
End: 2022-02-28
Payer: COMMERCIAL

## 2022-02-28 VITALS — BODY MASS INDEX: 26.31 KG/M2 | HEIGHT: 74 IN | RESPIRATION RATE: 16 BRPM | WEIGHT: 205 LBS

## 2022-02-28 DIAGNOSIS — M25.542 PAIN IN JOINTS OF LEFT HAND: ICD-10-CM

## 2022-02-28 DIAGNOSIS — L90.5 SCAR CONDITIONS AND FIBROSIS OF SKIN: ICD-10-CM

## 2022-02-28 PROCEDURE — 99203 OFFICE O/P NEW LOW 30 MIN: CPT

## 2022-02-28 PROCEDURE — 73140 X-RAY EXAM OF FINGER(S): CPT | Mod: F2

## 2022-08-08 ENCOUNTER — NON-APPOINTMENT (OUTPATIENT)
Age: 42
End: 2022-08-08

## 2022-09-21 ENCOUNTER — APPOINTMENT (OUTPATIENT)
Dept: ORTHOPEDIC SURGERY | Facility: CLINIC | Age: 42
End: 2022-09-21

## 2022-09-21 VITALS — WEIGHT: 205 LBS | BODY MASS INDEX: 26.31 KG/M2 | HEIGHT: 74 IN

## 2022-09-21 PROCEDURE — 99213 OFFICE O/P EST LOW 20 MIN: CPT

## 2022-09-22 NOTE — HISTORY OF PRESENT ILLNESS
[de-identified] : 9/21/22: 41M who presents today with left bicep pain since july 2022. Pain is over distal biceps and has remained the same within the past two months. Stopped doing isolated bicep exercises within the past week along with icing which has helped. No hx of injury the bicep.

## 2022-09-22 NOTE — PHYSICAL EXAM
[Left] : left elbow [NL (150)] : flexion 150 degrees [NL (0)] : extension 0 degrees [5___] : pronation 5[unfilled]/5 [] : no laceration/abrasion

## 2022-09-22 NOTE — DISCUSSION/SUMMARY
[de-identified] : 40 y/o male with left distal bicep strain, x-ray deferred today\par \par 1) nsaids prn\par 2) cryotherapy, rest and activity modification\par 3) rest from exercise and gradual return to and increase activity and lifting as tolerated\par 4) Discussed importance of stretching/warming up prior to exercise \par \par Entered by Radha Meraz acting as scribe. \par

## 2022-09-28 ENCOUNTER — APPOINTMENT (OUTPATIENT)
Dept: ORTHOPEDIC SURGERY | Facility: CLINIC | Age: 42
End: 2022-09-28

## 2022-09-28 VITALS — HEIGHT: 74 IN | WEIGHT: 205 LBS | BODY MASS INDEX: 26.31 KG/M2

## 2022-09-28 PROCEDURE — 99213 OFFICE O/P EST LOW 20 MIN: CPT

## 2022-09-28 NOTE — HISTORY OF PRESENT ILLNESS
[de-identified] : 9/28/22: Here for left knee pain. Has hx of meniscus tear from jan of 2021, most recent MRI from Feb 2022 with complex mmt.  Saw Dr. Britton. Attended physical therapy without lasting improvement. Pain has worsened recently with episodes of locking and instability of the left knee.  Reports left knee pain most present with increased activity.\par

## 2022-09-28 NOTE — DISCUSSION/SUMMARY
[de-identified] : 41m with persistent medial left knee pain, instability and locking. Hx of Complex mmt on previous MRI, now with worsening symptoms. Has failed conservative treatment over the past 6 months including physical therapy\par 1) new MRI left knee, eval meniscal tear\par 2) cryotherapy, rest and activity modification\par 3) rtc after MRI, will discuss further treatment \par \par \par Entered by Radha Meraz acting as scribe. \par

## 2022-09-28 NOTE — PHYSICAL EXAM
[NL (150)] : flexion 150 degrees [Left] : left knee [NL (0)] : extension 0 degrees [5___] : hamstring 5[unfilled]/5 [Positive] : positive Marian [] : no extensor lag [TWNoteComboBox7] : flexion 130 degrees

## 2022-10-08 ENCOUNTER — APPOINTMENT (OUTPATIENT)
Dept: MRI IMAGING | Facility: CLINIC | Age: 42
End: 2022-10-08

## 2022-10-12 ENCOUNTER — APPOINTMENT (OUTPATIENT)
Dept: ORTHOPEDIC SURGERY | Facility: CLINIC | Age: 42
End: 2022-10-12

## 2022-10-28 ENCOUNTER — APPOINTMENT (OUTPATIENT)
Dept: MRI IMAGING | Facility: CLINIC | Age: 42
End: 2022-10-28

## 2022-10-28 ENCOUNTER — FORM ENCOUNTER (OUTPATIENT)
Age: 42
End: 2022-10-28

## 2022-10-29 ENCOUNTER — APPOINTMENT (OUTPATIENT)
Dept: MRI IMAGING | Facility: CLINIC | Age: 42
End: 2022-10-29

## 2022-10-29 PROCEDURE — 73721 MRI JNT OF LWR EXTRE W/O DYE: CPT | Mod: LT

## 2022-11-09 ENCOUNTER — APPOINTMENT (OUTPATIENT)
Dept: ORTHOPEDIC SURGERY | Facility: CLINIC | Age: 42
End: 2022-11-09

## 2022-11-09 PROCEDURE — 99214 OFFICE O/P EST MOD 30 MIN: CPT

## 2022-11-09 NOTE — DISCUSSION/SUMMARY
[de-identified] : 42m with continued instability with known left medial meniscal tear. also with continue pain from left distal biceps with concern fortear.\par 1) U/S of left upper extremity to eval for distal biceps\par 2) will book for left knee arthroscopy\par \par r/b/a of surgical intervention and conservative management discussed. pt given to opportunity to ask all questions and all questions were answered.   Pt would like to proceed with surgery as discussed.\par \par The patient was advised of the diagnosis. The natural history of the pathology was explained in full to the patient in layman's terms. All questions were answered. The risks and benefits of surgical and non-surgical treatment alternatives were explained in full to the patient. \par The patient demonstrated a full understanding of the surgical and non-surgical options. The risks of surgery were outlined in full to the patient including but not limited to bleeding, scarring, infection, sepsis, neurologic injury, vascular injury, failure to resolve symptoms, symptom recurrence, the need for further surgery, non-healing, wound breakdown, deep vein thrombosis, pulmonary embolism, spontaneous osteonecrosis, anesthesia complications and even death. The patient understood all the risks and accepted them and understood that other complications could occur that are not mentioned above. The intraoperative plan, post-operative plan, post-operative expectations and limitations were explained in full. Expectations from non-surgical treatment were explained in full as well. The patient demonstrated a complete understanding of the treatment alternatives and requested the above-mentioned procedure. This will be scheduled accordingly\par \par \par

## 2022-11-09 NOTE — DATA REVIEWED
[MRI] : MRI [Left] : left [Knee] : knee [Report was reviewed and noted in the chart] : The report was reviewed and noted in the chart [FreeTextEntry1] : 1. Similar appearing complex tearing involving the posterior horn and body of the medial meni\par surrounding synovitis.\par 2. Slight effusion, synovitis, small popliteal cyst, and slight prepatellar soft tissue swelling.\par 3. Mild chronic ACL sprain.\par 4. Partially discoid lateral meniscus.\par 5. No acute osseous injury.

## 2022-11-09 NOTE — PHYSICAL EXAM
[5___] : hamstring 5[unfilled]/5 [Positive] : positive Marian [Left] : left elbow [NL (150)] : flexion 150 degrees [NL (0)] : extension 0 degrees [4___] : supination 4[unfilled]/5 [] : no extensor lag [TWNoteComboBox7] : flexion 130 degrees

## 2022-11-09 NOTE — HISTORY OF PRESENT ILLNESS
[de-identified] : 11/9/2022: here for f/up for left knee pain. still with episodes of buckling.  had mri since last visit. also with continued pain and discomfort from left distal biceps.  has tried activity modification and home exercise program\par \par 9/28/22: Here for left knee pain. Has hx of meniscus tear from jan of 2021, most recent MRI from Feb 2022 with complex mmt.  Saw Dr. Britton. Attended physical therapy without lasting improvement. Pain has worsened recently with episodes of locking and instability of the left knee.  Reports left knee pain most present with increased activity.\par

## 2022-11-11 NOTE — ED STATDOCS - CROS ED NEURO ALL NEG
Brief Postoperative Note      Patient: Louis Hall  YOB: 2021  MRN: [id number]    Date of Procedure: 11/11/2022    Pre-Op Diagnosis: Right type II supracondylar humerus fracture    Post-Op Diagnosis: Right type II supracondylar humerus fracture       Procedure(s):   - Right supracondylar humerus fracture closed reduction and percutaneous pin fixation (38206)      Surgeon(s):  Esther Ayala DO    Assistant:  Resident: Lanny Stokes DO    Anesthesia: General    Estimated Blood Loss (mL): <2     IVF (mL): 877     Complications: None    Specimens:   * No specimens in log *    Implants:  Implant Name Type Inv.  Item Serial No.  Lot No. LRB No. Used Action   WIRE ORTH SMOOTH SGL SHRP TIP TRCR PLN S STL ST 16MM JOHN - [id number]  WIRE ORTH SMOOTH SGL SHRP TIP TRCR PLN S STL ST 16MM JOHN  [employer]  Right 2 Implanted         Drains: * No LDAs found *    Findings: See op report     Electronically signed by Esther Ayala DO on 11/11/2022 at 11:31 AM
- - -

## 2022-12-09 ENCOUNTER — NON-APPOINTMENT (OUTPATIENT)
Age: 42
End: 2022-12-09

## 2023-01-11 ENCOUNTER — APPOINTMENT (OUTPATIENT)
Dept: ORTHOPEDIC SURGERY | Facility: CLINIC | Age: 43
End: 2023-01-11
Payer: COMMERCIAL

## 2023-01-11 VITALS — HEIGHT: 74 IN | WEIGHT: 205 LBS | BODY MASS INDEX: 26.31 KG/M2

## 2023-01-11 PROCEDURE — 99213 OFFICE O/P EST LOW 20 MIN: CPT

## 2023-01-11 NOTE — PHYSICAL EXAM
[NL (150)] : flexion 150 degrees [Left] : left knee [NL (0)] : extension 0 degrees [5___] : hamstring 5[unfilled]/5 [Positive] : positive Marian [] : patient ambulates without assistive device [TWNoteComboBox7] : flexion 130 degrees

## 2023-01-11 NOTE — DISCUSSION/SUMMARY
[de-identified] : 42m with persistent left knee pain and instability and medial meniscal tear on MRI. Surgical intervention for left knee arthroscopy, partial medial meniscectomy discussed at last visit. Pt would like to proceed with surgery. \par r/b/a of surgical intervention and conservative management discussed. pt given to opportunity to ask all questions and all questions were answered.   Pt would like to proceed with surgery as discussed.\par The patient was advised of the diagnosis. The natural history of the pathology was explained in full to the patient in layman's terms. All questions were answered. The risks and benefits of surgical and non-surgical treatment alternatives were explained in full to the patient. \par The patient demonstrated a full understanding of the surgical and non-surgical options. The risks of surgery were outlined in full to the patient including but not limited to bleeding, scarring, infection, sepsis, neurologic injury, vascular injury, failure to resolve symptoms, symptom recurrence, the need for further surgery, non-healing, wound breakdown, deep vein thrombosis, pulmonary embolism, spontaneous osteonecrosis, anesthesia complications and even death. The patient understood all the risks and accepted them and understood that other complications could occur that are not mentioned above. The intraoperative plan, post-operative plan, post-operative expectations and limitations were explained in full. Expectations from non-surgical treatment were explained in full as well. The patient demonstrated a complete understanding of the treatment alternatives and requested the above-mentioned procedure. This will be scheduled accordingly\par \par \par

## 2023-01-11 NOTE — HISTORY OF PRESENT ILLNESS
[5] : 5 [4] : 4 [Dull/Aching] : dull/aching [Ice] : ice [de-identified] : 01/11/23: Here to f/up left knee, reports continued left knee pain and instability. . Does reports that in early December he had a slip and fall and injured his left forearm with bruising and also re-aggravated the left knee.  Also states he underwent a PRP for the left elbow/distal biceps. \par 11/9/2022: here for f/up for left knee pain. still with episodes of buckling.  had mri since last visit. also with continued pain and discomfort from left distal biceps.  has tried activity modification and home exercise program\par \par 9/28/22: Here for left knee pain. Has hx of meniscus tear from jan of 2021, most recent MRI from Feb 2022 with complex mmt.  Saw Dr. Britton. Attended physical therapy without lasting improvement. Pain has worsened recently with episodes of locking and instability of the left knee.  Reports left knee pain most present with increased activity.\par  [FreeTextEntry1] : L knee [de-identified] : none

## 2023-01-11 NOTE — REASON FOR VISIT
[FreeTextEntry2] : F/U left knee. pain comes and goes. has knee sx scheduled\par did not get U/S of distal biceps

## 2023-02-03 ENCOUNTER — APPOINTMENT (OUTPATIENT)
Dept: ORTHOPEDIC SURGERY | Facility: AMBULATORY SURGERY CENTER | Age: 43
End: 2023-02-03
Payer: COMMERCIAL

## 2023-02-03 DIAGNOSIS — S83.242A OTHER TEAR OF MEDIAL MENISCUS, CURRENT INJURY, LEFT KNEE, INITIAL ENCOUNTER: ICD-10-CM

## 2023-02-03 PROCEDURE — 29881 ARTHRS KNE SRG MNISECTMY M/L: CPT | Mod: LT

## 2023-02-03 PROCEDURE — 29881 ARTHRS KNE SRG MNISECTMY M/L: CPT | Mod: AS,LT

## 2023-02-03 RX ORDER — HYDROCODONE BITARTRATE AND ACETAMINOPHEN 5; 325 MG/1; MG/1
5-325 TABLET ORAL EVERY 6 HOURS
Qty: 10 | Refills: 0 | Status: ACTIVE | COMMUNITY
Start: 2023-02-03 | End: 1900-01-01

## 2023-02-03 RX ORDER — ASPIRIN 325 MG/1
325 TABLET, FILM COATED ORAL DAILY
Qty: 14 | Refills: 0 | Status: ACTIVE | COMMUNITY
Start: 2023-02-03 | End: 1900-01-01

## 2023-02-15 ENCOUNTER — APPOINTMENT (OUTPATIENT)
Dept: ORTHOPEDIC SURGERY | Facility: CLINIC | Age: 43
End: 2023-02-15
Payer: COMMERCIAL

## 2023-02-15 DIAGNOSIS — Z98.890 OTHER SPECIFIED POSTPROCEDURAL STATES: ICD-10-CM

## 2023-02-15 PROCEDURE — 99024 POSTOP FOLLOW-UP VISIT: CPT

## 2023-02-15 NOTE — DISCUSSION/SUMMARY
[de-identified] : 42m s/p left knee arthroscopy, partial medial meniscectomy 02.03.23\par 1) start physical therapy\par 2) reviewed post-op precautions and procedures.\par 3) cryotherapy, rest and activity modification\par 4) rtc 4 weeks\par \par Entered by Radha Meraz acting as scribe.\par

## 2023-02-15 NOTE — PHYSICAL EXAM
[NL (150)] : flexion 150 degrees [NL (0)] : extension 0 degrees [Left] : left knee [] : healed incision

## 2023-02-15 NOTE — HISTORY OF PRESENT ILLNESS
[de-identified] : dos: 02.03.23\par 2/15/23: Here for pov1 s/p left knee arthroscopy partial medial meniscectomy. Doing well post operatively. Has not started PT\par \par 01/11/23: Here to f/up left knee, reports continued left knee pain and instability. . Does reports that in early December he had a slip and fall and injured his left forearm with bruising and also re-aggravated the left knee.  Also states he underwent a PRP for the left elbow/distal biceps. \par 11/9/2022: here for f/up for left knee pain. still with episodes of buckling.  had mri since last visit. also with continued pain and discomfort from left distal biceps.  has tried activity modification and home exercise program\par \par 9/28/22: Here for left knee pain. Has hx of meniscus tear from jan of 2021, most recent MRI from Feb 2022 with complex mmt.  Saw Dr. Britton. Attended physical therapy without lasting improvement. Pain has worsened recently with episodes of locking and instability of the left knee.  Reports left knee pain most present with increased activity.\par

## 2023-05-10 ENCOUNTER — APPOINTMENT (OUTPATIENT)
Dept: ORTHOPEDIC SURGERY | Facility: CLINIC | Age: 43
End: 2023-05-10

## 2023-05-11 ENCOUNTER — NON-APPOINTMENT (OUTPATIENT)
Age: 43
End: 2023-05-11

## 2023-05-12 ENCOUNTER — APPOINTMENT (OUTPATIENT)
Dept: MRI IMAGING | Facility: CLINIC | Age: 43
End: 2023-05-12

## 2023-05-15 ENCOUNTER — APPOINTMENT (OUTPATIENT)
Dept: MRI IMAGING | Facility: CLINIC | Age: 43
End: 2023-05-15

## 2023-06-28 ENCOUNTER — APPOINTMENT (OUTPATIENT)
Dept: ORTHOPEDIC SURGERY | Facility: CLINIC | Age: 43
End: 2023-06-28

## 2023-07-05 ENCOUNTER — APPOINTMENT (OUTPATIENT)
Dept: ORTHOPEDIC SURGERY | Facility: CLINIC | Age: 43
End: 2023-07-05
Payer: COMMERCIAL

## 2023-07-05 DIAGNOSIS — M23.92 UNSPECIFIED INTERNAL DERANGEMENT OF LEFT KNEE: ICD-10-CM

## 2023-07-05 PROCEDURE — 99213 OFFICE O/P EST LOW 20 MIN: CPT

## 2023-07-05 NOTE — HISTORY OF PRESENT ILLNESS
[de-identified] : DOS 02.03.23\par 7/5/23- Left elbow pain persists, L elbow MRI not approved.  Left knee pain also persists with giving way, popping \par 5/11/23- spoke via televist regarding left elbow, left knee\par dos: 02.03.23\par 2/15/23: Here for pov1 s/p left knee arthroscopy partial medial meniscectomy. Doing well post operatively. Has not started PT\par \par 01/11/23: Here to f/up left knee, reports continued left knee pain and instability. . Does reports that in early December he had a slip and fall and injured his left forearm with bruising and also re-aggravated the left knee.  Also states he underwent a PRP for the left elbow/distal biceps. \par 11/9/2022: here for f/up for left knee pain. still with episodes of buckling.  had mri since last visit. also with continued pain and discomfort from left distal biceps.  has tried activity modification and home exercise program\par \par 9/28/22: Here for left knee pain. Has hx of meniscus tear from jan of 2021, most recent MRI from Feb 2022 with complex mmt.  Saw Dr. Britton. Attended physical therapy without lasting improvement. Pain has worsened recently with episodes of locking and instability of the left knee.  Reports left knee pain most present with increased activity.\par

## 2023-07-05 NOTE — DISCUSSION/SUMMARY
[de-identified] : 42M with left elbow partial tear of the distal biceps. Has failed extensive conservative management including physical therapy and prp injectio\par 1) MRI left elbow eval progression of partial tear distal biceps\par 2) cryotherapy, rest and activity modification\par 3) rtc after MRI\par \par \par Entered by Radha Meraz acting as scribe.\par Dr. Britton-\par The documentation recorded by the scribe accurately reflects the service I personally performed and the decisions made by me.\par

## 2023-07-20 ENCOUNTER — FORM ENCOUNTER (OUTPATIENT)
Age: 43
End: 2023-07-20

## 2023-07-21 ENCOUNTER — APPOINTMENT (OUTPATIENT)
Dept: MRI IMAGING | Facility: CLINIC | Age: 43
End: 2023-07-21
Payer: COMMERCIAL

## 2023-07-21 PROCEDURE — 73221 MRI JOINT UPR EXTREM W/O DYE: CPT | Mod: LT

## 2023-07-26 ENCOUNTER — APPOINTMENT (OUTPATIENT)
Dept: ORTHOPEDIC SURGERY | Facility: CLINIC | Age: 43
End: 2023-07-26
Payer: COMMERCIAL

## 2023-07-26 VITALS — BODY MASS INDEX: 26.31 KG/M2 | HEIGHT: 74 IN | WEIGHT: 205 LBS

## 2023-07-26 DIAGNOSIS — S46.212A STRAIN OF MUSCLE, FASCIA AND TENDON OF OTHER PARTS OF BICEPS, LEFT ARM, INITIAL ENCOUNTER: ICD-10-CM

## 2023-07-26 PROCEDURE — 99213 OFFICE O/P EST LOW 20 MIN: CPT

## 2023-07-26 NOTE — HISTORY OF PRESENT ILLNESS
[de-identified] : DOS 02.03.23\par 7/26/23: here for f/up left elbow and MRI results\par 7/5/23- Left elbow pain persists, L elbow MRI not approved.  Left knee pain also persists with giving way, popping \par 5/11/23- spoke via televist regarding left elbow, left knee\par dos: 02.03.23\par 2/15/23: Here for pov1 s/p left knee arthroscopy partial medial meniscectomy. Doing well post operatively. Has not started PT\par \par 01/11/23: Here to f/up left knee, reports continued left knee pain and instability. . Does reports that in early December he had a slip and fall and injured his left forearm with bruising and also re-aggravated the left knee.  Also states he underwent a PRP for the left elbow/distal biceps. \par 11/9/2022: here for f/up for left knee pain. still with episodes of buckling.  had mri since last visit. also with continued pain and discomfort from left distal biceps.  has tried activity modification and home exercise program\par \par 9/28/22: Here for left knee pain. Has hx of meniscus tear from jan of 2021, most recent MRI from Feb 2022 with complex mmt.  Saw Dr. Britton. Attended physical therapy without lasting improvement. Pain has worsened recently with episodes of locking and instability of the left knee.  Reports left knee pain most present with increased activity.\par

## 2023-07-26 NOTE — PHYSICAL EXAM
[NL (150)] : flexion 150 degrees [NL (0)] : extension 0 degrees [Pain with Pronation] : pain with pronation [Pain with Supination] : pain with supination [Left] : left knee [] : no sero-sanguinous drainage

## 2023-07-26 NOTE — DATA REVIEWED
[MRI] : MRI [Left] : left [Elbow] : elbow [I independently reviewed and interpreted images and report] : I independently reviewed and interpreted images and report [I reviewed the films/CD] : I reviewed the films/CD [FreeTextEntry1] : MRI L Elbow (7/21/23):\par 1. Mild distal triceps tendinitis with mild adjacent olecranon bursitis dorsally without tendon tear.\par 2. Mild chronic appearing distal biceps tendinopathy without tear.\par 3. Patient motion degrades image quality on multiple sequences.

## 2023-07-26 NOTE — DISCUSSION/SUMMARY
[de-identified] : 42M with left elbow chronic tendinopathy  distal biceps. Has failed extensive conservative management including physical therapy and prp injectio\par 1) activity modification discussed\par 2) cryotherapy, rest and activity modification\par 3) acupuncture and cupping recommended\par \par

## 2024-03-11 ENCOUNTER — APPOINTMENT (OUTPATIENT)
Dept: ORTHOPEDIC SURGERY | Facility: CLINIC | Age: 44
End: 2024-03-11
Payer: COMMERCIAL

## 2024-03-11 VITALS — HEIGHT: 74 IN | BODY MASS INDEX: 26.31 KG/M2 | WEIGHT: 205 LBS

## 2024-03-11 PROCEDURE — 99213 OFFICE O/P EST LOW 20 MIN: CPT

## 2024-03-12 NOTE — HISTORY OF PRESENT ILLNESS
[de-identified] : The patient is a 43 year  old left hand dominant male who presents today complaining of left shoulder pain.  Date of Injury/Onset: 12/2023 Pain:    At Rest: 6-8/10  With Activity:  6-8/10  Mechanism of injury: No PARUL, gradual onset. Believes it could be from heavy lifting at the gym This is NOT a Work Related Injury being treated under Worker's Compensation. This is NOT an athletic injury occurring associated with an interscholastic or organized sports team. Quality of symptoms: radiating pain, achiness, intermittent sharp, clicking Improves with: rest, heat, stim, HEP Worse with: sleeping Prior treatment: acupuncture, massage  Prior Imaging: none Out of work/sport: currently working School/Sport/Position/Occupation: sales/banker, weight lifting Additional Information: None

## 2024-03-12 NOTE — IMAGING
[de-identified] : The patient is a well appearing 43 year  old male of their stated age.  Neck is supple & nontender to palpation. POSITIVE SPURLING'S ON THE LEFT.   Effected Shoulder: LEFT  Inspection:  Scapula Winging: Negative  Deformity: None  Erythema: None  Ecchymosis: None  Abrasions: None  Effusion: None   Range of Motion:  Active Forward Flexion: 180 degrees   Active Abduction: 180 degrees   Passive Forward Flexion: 180 degrees   Passive Abduction: 180 degrees   ER @ 90 degrees: 90 degrees  IR @ 90 degrees: 45 degrees  ER @ 0 degrees: 50 degrees  Motor Exam:  Forward Flexion: 5 out of 5  Flexion Plane of Scapula: 5 out of 5  Abduction: 5 out of 5  Internal Rotation: 5 out of 5  External Rotation: 5 out of 5  Distal Motor Strength: 5 out of 5  Other:  TRICEPS: 4+ out of 5 RESISTED WRIST FLEXION: 5- out of 5 RESISTED WRIST EXTENSION: 5- out of 5  Stability Testing:  Anterior: 1+  Posterior: 1+  Sulcus N: 1+  Sulcus ER: 1+  Provocative Tests:  Drop Arm: Negative  Impingement: Negative  [place]: Negative  X-Arm Adduction: Negative  Belly Press: Negative  Bear Hug: Negative  Lift Off: Negative  Apprehension: Negative  Relocation: Negative  Posterior Load & Shift: Negative   Palpation:  AC Joint: Nontender  Clavicle: Nontender  SC Joint: TENDER  Bicepital Groove: Nontender  Coracoid Process: Nontender  Pectoralis Minor Tendon: Nontender  Pectoralis Major Tendon: Nontender & palpably intact  Trapezius: TENDER  Latissimus Dorsi: TENDER  Proximal Humerus: Nontender  Scapula Body: Nontender  Medial Scapula Boarder: TENDER  Scapula Spine: Nontender   Neurologic Exam: Sensation to Light Touch:  Axillary: Grossly intact  Ulnar: Grossly intact  Radial: Grossly intact  Median: Grossly intact  Other:  N/A  Circulatory/Pulses:  Ulnar: 2+  Radial: 2+  Other Pertinent Findings: None   Contralateral Shoulder  Range of Motion:  Active Forward Flexion: 180 degrees   Active Abduction: 180 degrees   Passive Forward Flexion: 180 degrees   Passive Abduction: 180 degrees   ER @ 90 degrees: 90 degrees  IR @ 90 degrees: 45 degrees  ER @ 0 degrees: 50 degrees  Motor Exam:  Forward Flexion: 5 out of 5  Flexion Plane of Scapula: 5 out of 5  Abduction: 5 out of 5  Internal Rotation: 5 out of 5  External Rotation: 5 out of 5  Distal Motor Strength: 5 out of 5  Stability Testing:  Anterior: 1+  Posterior: 1+  Sulcus N: 1+  Sulcus ER: 1+  Other Pertinent Findings: None   Assessment: The patient is a 43 year old male with left shoulder pain and radiographic and physical exam findings consistent with possible cervical disc herniation.    The patients condition is acute  Documents/Results Reviewed Today: None (Patient declines radiation exposure) Tests/Studies Independently Interpreted Today: None (Patient declines radiation exposure) Pertinent findings include: SC joint tenderness, latissimus dorsi tenderness, trapezius tenderness, medial scapula border tenderness, 5-/5 resisted wrist flexion and extension, 4+/5 triceps strength, +Spurling's on the left  Confounding medical conditions/concerns: None  Plan: Due to worsening pain and instability with mechanical symptoms, patient will obtain MRI cervical spine to evaluate for cervical disc herniation. In the meantime, take OTC antiinflammatories as needed - use as directed. Modify activity as discussed.    Tests Ordered: MRI cervical spine  Prescription Medications Ordered: Discussed appropriate use of OTC anti-inflammatories and topical analgesics (including but not limited to Aleve, Advil, Tylenol, Motrin, Ibuprofen, Voltaren gel, etc.)  Braces/DME Ordered: None  Activity/Work/Sports Status: None  Additional Instructions: None Follow-Up: after MRI   The patient's current medication management of their orthopedic diagnosis was reviewed today: (1) We discussed a comprehensive treatment plan that included possible pharmaceutical management involving the use of prescription strength medications including but not limited to options such as oral Naprosyn 500mg BID, once daily Meloxicam 15 mg, or 500-650 mg Tylenol versus over the counter oral medications and topical prescription NSAID Pennsaid vs over the counter Voltaren gel.  Based on our extensive discussion, the patient declined prescription medication and will use over the counter Advil, Alleve, Voltaren Gel or Tylenol as directed. (2) There is a moderate risk of morbidity with further treatment, especially from use of prescription strength medications and possible side effects of these medications which include upset stomach with oral medications, skin reactions to topical medications and cardiac/renal issues with long term use. (3) I recommended that the patient follow-up with their medical physician to discuss any significant specific potential issues with long term medication use such as interactions with current medications or with exacerbation of underlying medical comorbidities. (4) The benefits and risks associated with use of injectable, oral or topical, prescription and over the counter anti-inflammatory medications were discussed with the patient. The patient voiced understanding of the risks including but not limited to bleeding, stroke, kidney dysfunction, heart disease, and were referred to the black box warning label for further information.  I, Christina Mcneil, attest that this documentation has been prepared under the direction and in the presence of Provider Dr. Thomas Feng.  The documentation recorded by the scribe accurately reflects the services IDr. Thomas, personally performed and the decisions made by me.

## 2024-03-14 ENCOUNTER — APPOINTMENT (OUTPATIENT)
Dept: MRI IMAGING | Facility: CLINIC | Age: 44
End: 2024-03-14

## 2024-03-18 ENCOUNTER — RESULT REVIEW (OUTPATIENT)
Age: 44
End: 2024-03-18

## 2024-03-25 ENCOUNTER — APPOINTMENT (OUTPATIENT)
Dept: ORTHOPEDIC SURGERY | Facility: CLINIC | Age: 44
End: 2024-03-25
Payer: COMMERCIAL

## 2024-03-25 VITALS — WEIGHT: 205 LBS | HEIGHT: 74 IN | BODY MASS INDEX: 26.31 KG/M2

## 2024-03-25 DIAGNOSIS — M50.20 OTHER CERVICAL DISC DISPLACEMENT, UNSPECIFIED CERVICAL REGION: ICD-10-CM

## 2024-03-25 DIAGNOSIS — R29.898 OTHER SYMPTOMS AND SIGNS INVOLVING THE MUSCULOSKELETAL SYSTEM: ICD-10-CM

## 2024-03-25 PROCEDURE — 99214 OFFICE O/P EST MOD 30 MIN: CPT

## 2024-03-25 RX ORDER — METHYLPREDNISOLONE 4 MG/1
4 TABLET ORAL
Qty: 1 | Refills: 0 | Status: ACTIVE | COMMUNITY
Start: 2024-03-25 | End: 1900-01-01

## 2024-03-25 NOTE — HISTORY OF PRESENT ILLNESS
[de-identified] : The patient is a 43 year  old left hand dominant male who presents today complaining of left shoulder pain.  Date of Injury/Onset: 12/2023 Pain:    At Rest: 6-8/10  With Activity:  6-8/10  Mechanism of injury: No PARUL, gradual onset. Believes it could be from heavy lifting at the gym This is NOT a Work Related Injury being treated under Worker's Compensation. This is NOT an athletic injury occurring associated with an interscholastic or organized sports team. Quality of symptoms: radiating pain, achiness, intermittent sharp, clicking Improves with: rest, heat, stim, HEP Worse with: sleeping Treatment/Imaging/Studies Since Last Visit: PT, MRI 	Reports Available For Review Today: MRI report Out of work/sport: currently working School/Sport/Position/Occupation: sales/banker, weight lifting Changes since last visit: patient reports some improvement in pain since going to PT. Here to review MRI report. Additional Information: None

## 2024-03-25 NOTE — DATA REVIEWED
[MRI] : MRI [Cervical Spine] : cervical spine [Report was reviewed and noted in the chart] : The report was reviewed and noted in the chart [I independently reviewed and interpreted images and report] : I independently reviewed and interpreted images and report [I reviewed the films/CD and additionally noted] : I reviewed the films/CD and additionally noted [FreeTextEntry1] :  moderate central and right-sided disc herniation at C4-C5, impinging on the C5 nerve root.

## 2024-03-25 NOTE — IMAGING
[de-identified] : The patient is a well appearing 43 year  old male of their stated age.  Neck is supple & nontender to palpation. POSITIVE SPURLING'S ON THE LEFT.   Effected Shoulder: LEFT  Inspection:  Scapula Winging: Negative  Deformity: None  Erythema: None  Ecchymosis: None  Abrasions: None  Effusion: None   Range of Motion:  Active Forward Flexion: 180 degrees   Active Abduction: 180 degrees   Passive Forward Flexion: 180 degrees   Passive Abduction: 180 degrees   ER @ 90 degrees: 90 degrees  IR @ 90 degrees: 45 degrees  ER @ 0 degrees: 50 degrees  Motor Exam:  Forward Flexion: 5 out of 5  Flexion Plane of Scapula: 5 out of 5  Abduction: 5 out of 5  Internal Rotation: 5 out of 5  External Rotation: 5 out of 5  Distal Motor Strength: 5 out of 5  Other:  TRICEPS: 4+ out of 5 RESISTED WRIST FLEXION: 5- out of 5 RESISTED WRIST EXTENSION: 5- out of 5  Stability Testing:  Anterior: 1+  Posterior: 1+  Sulcus N: 1+  Sulcus ER: 1+  Provocative Tests:  Drop Arm: Negative  Impingement: Negative  [place]: Negative  X-Arm Adduction: Negative  Belly Press: Negative  Bear Hug: Negative  Lift Off: Negative  Apprehension: Negative  Relocation: Negative  Posterior Load & Shift: Negative   Palpation:  AC Joint: Nontender  Clavicle: Nontender  SC Joint: TENDER  Bicepital Groove: Nontender  Coracoid Process: Nontender  Pectoralis Minor Tendon: Nontender  Pectoralis Major Tendon: Nontender & palpably intact  Trapezius: TENDER  Latissimus Dorsi: TENDER  Proximal Humerus: Nontender  Scapula Body: Nontender  Medial Scapula Boarder: TENDER  Scapula Spine: Nontender   Neurologic Exam: Sensation to Light Touch:  Axillary: Grossly intact  Ulnar: Grossly intact  Radial: Grossly intact  Median: Grossly intact  Other:  N/A  Circulatory/Pulses:  Ulnar: 2+  Radial: 2+  Other Pertinent Findings: None   Contralateral Shoulder  Range of Motion:  Active Forward Flexion: 180 degrees   Active Abduction: 180 degrees   Passive Forward Flexion: 180 degrees   Passive Abduction: 180 degrees   ER @ 90 degrees: 90 degrees  IR @ 90 degrees: 45 degrees  ER @ 0 degrees: 50 degrees  Motor Exam:  Forward Flexion: 5 out of 5  Flexion Plane of Scapula: 5 out of 5  Abduction: 5 out of 5  Internal Rotation: 5 out of 5  External Rotation: 5 out of 5  Distal Motor Strength: 5 out of 5  Stability Testing:  Anterior: 1+  Posterior: 1+  Sulcus N: 1+  Sulcus ER: 1+  Other Pertinent Findings: None   Assessment: The patient is a 43 year old male with left shoulder pain and radiographic and physical exam findings consistent with a cervical disc herniation.    The patients condition is acute  Documents/Results Reviewed Today: MRI cervical spine  Tests/Studies Independently Interpreted Today: MRI cervical spine reveals moderate central and right-sided disc herniation at C4-C5 impinging on the C5 nerve root.  Pertinent findings include: SC joint tenderness, latissimus dorsi tenderness, trapezius tenderness, medial scapula border tenderness, 5-/5 resisted wrist flexion and extension, 4+/5 triceps strength, +Spurling's on the left  Confounding medical conditions/concerns: None  Plan: Referred patient to pain management specialist, Dr. Patel, for further evaluation and discussion of treatment options. In the meantime, patient will start physical therapy, HEP, and stretching. Advised patient to rest and ice/heat the area as tolerated. Prescribed patient a Medrol Dose Pack to reduce inflammation and pain - use as directed. Modify activity as discussed.    Tests Ordered: None  Prescription Medications Ordered: Medrol Dose Pack Braces/DME Ordered: None  Activity/Work/Sports Status: None  Additional Instructions: None Follow-Up: with Dr. Patel  The patient's current medication management of their orthopedic diagnosis was reviewed today: (1) We discussed a comprehensive treatment plan that included possible pharmaceutical management involving the use of prescription strength medications including but not limited to options such as oral Naprosyn 500mg BID, once daily Meloxicam 15 mg, or 500-650 mg Tylenol versus over the counter oral medications and topical prescription NSAID Pennsaid vs over the counter Voltaren gel.  Based on our extensive discussion, the patient was prescribed a Medrol Dose Pack to be taken as directed for the next five days.  Following which OTC NSAIDs may be used PRN for pain, inflammation, and discomfort.  No NSAID medications should be taken concurrently with the Medrol Dose Pack. (2) There is a moderate risk of morbidity with further treatment, especially from use of prescription strength medications and possible side effects of these medications which include upset stomach with oral medications, skin reactions to topical medications and cardiac/renal issues with long term use. (3) I recommended that the patient follow-up with their medical physician to discuss any significant specific potential issues with long term medication use such as interactions with current medications or with exacerbation of underlying medical comorbidities. (4) The benefits and risks associated with use of injectable, oral or topical, prescription and over the counter anti-inflammatory medications were discussed with the patient. The patient voiced understanding of the risks including but not limited to bleeding, stroke, kidney dysfunction, heart disease, and were referred to the black box warning label for further information.  I, Christina Mcneil, attest that this documentation has been prepared under the direction and in the presence of Provider Dr. Thomas Feng.  The documentation recorded by the scribe accurately reflects the services IDr. Thomas, personally performed and the decisions made by me.

## 2024-04-04 ENCOUNTER — APPOINTMENT (OUTPATIENT)
Dept: PAIN MANAGEMENT | Facility: CLINIC | Age: 44
End: 2024-04-04
Payer: COMMERCIAL

## 2024-04-04 VITALS — WEIGHT: 205 LBS | BODY MASS INDEX: 26.31 KG/M2 | HEIGHT: 74 IN

## 2024-04-04 PROCEDURE — 99203 OFFICE O/P NEW LOW 30 MIN: CPT

## 2024-04-04 NOTE — HISTORY OF PRESENT ILLNESS
[Neck] : neck [4] : 4 [6] : 6 [Radiating] : radiating [Constant] : constant [Leisure] : leisure [Sleep] : sleep [Meds] : meds [FreeTextEntry1] : 04/04/2024 : Patient presents for initial evaluation. He is having pain on his neck and the pain to his shoulders. Had pain by the collar bone for the last 4 months or so. He remembers waking up with neck pain that had resolved initially. He then had difficulty getting comfortable sleeping at night.  No radicular pain.  Physical therapist told him it was a thoracic nerve and he needs to stretch.   Subjective Weakness: No   Numbness/Tingling:  Left fingers Bladder/Bowel dysfunction: No   Treatments Tried:  Physical therapy, acupuncture, massages.  Attempted modalities for current pain complaint:  See above:    Medications: Yes   Injections: No    Previous Spine Surgery: No Imaging:  MRI Cervical Spine (3/18/24) - Very minimal retrolisthesis at C4-C5. Minimal-to-mild spondylosis as noted. Small central disc protrusion at C2-C3. Moderate central and right-sided disc protrusion at C4-C5, encroaching on the right C5 nerve root. [] : Post Surgical Visit: no [FreeTextEntry6] : SORENESS  [FreeTextEntry7] : shoulders  [de-identified] : LOOKING DOWN  [de-identified] : L MRI

## 2024-04-04 NOTE — ASSESSMENT
[FreeTextEntry1] : After discussing various treatment options with the patient including but not limited to oral medications, physical therapy, exercise, modalities as well as interventional spinal injections, we have decided with the following plan:  1) Intervention Injection Therapy: I personally reviewed the MRI/CT scan images and agree with the radiologist's report. The radiological findings were discussed with the patient. The risks, benefits, contents and alternatives to injection were explained in full to the patient. Risks outlined include but are not limited to infection,sepsis, bleeding, post-dural puncture headache, nerve damage, temporary increase in pain, syncopal episode, failure to resolve symptoms, allergic reaction, symptom recurrence, and elevation of blood sugar in diabetics. Cortisone may cause immunosuppression. Patient understands the risks. All questions were answered. After discussion of options, patient requested an injection. Information regarding the injection was given to the patient. Which medications to stop prior to the injection was explained to the patient as well.  Follow up in 1-2 weeks post injection for re-evaluation.  Continue Home exercises, stretching, activity modification, physical therapy, and conservative care. Patient is presenting with acute/sub-acute radicular pain with impairment in ADLs and functionality.  The pain has not responded sufficiently to  conservative care including nsaid therapy and/or physical therapy.  There is no bleeding tendency, unstable medical condition, or systemic infection. The purpose of the spinal injections is to facilitate active therapy by providing short term relief through reduction of pain and inflammation.   Injections, by themselves, are not likely to provide long-term relief. Rather, active rehabilitation with modified work achieves long-term relief by increasing active ROM, strength and stability.   C7-T1 Cervical Epidural Steroid Injection under fluoroscopic guidance with image. Of note, the C7-T1 level has been determined to be the safest level to inject in the cervical spine as the epidural space is the largest. Despite pathology at different levels, studies have shown that the medication will spread up to the most cranial level, despite the level of injection.

## 2024-04-09 ENCOUNTER — APPOINTMENT (OUTPATIENT)
Dept: PAIN MANAGEMENT | Facility: CLINIC | Age: 44
End: 2024-04-09
Payer: COMMERCIAL

## 2024-04-09 PROCEDURE — 62321 NJX INTERLAMINAR CRV/THRC: CPT

## 2024-04-09 NOTE — PROCEDURE
[FreeTextEntry3] : Date of Service: 04/09/2024   Account: [id number]   Patient: KASI LUZ   YOB: 1980   Age: 43 year     Surgeon:                                                      Rashmi Reyes M.D.   Pre-Operative Diagnosis:                          Cervical Radiculopathy (M54.12)   Post Operative Diagnosis:                        Cervical Radiculopathy (M54.12)   Procedure:                                                  Interlaminar cervical epidural steroid injection (C7-T1) under fluoroscopic guidance   Anesthesia:                                                 None     This procedure was carried out using fluoroscopic guidance.  The risks and benefits of the procedure were discussed extensively with the patient.  The consent of the patient was obtained and the following procedure was performed.   The patient was placed in the prone position using a thoracic and chin support.  The cervical area was prepped and draped in a sterile fashion.  The fluoroscope visualized the C7-T1 interspace using slight cephalad-caudad angulation and this area was marked.  Using sterile technique the superficial skin was anesthetized with 1% Lidocaine without epinephrine.  A 18 gauge Tuohoy needle was advanced under fluoroscopy using igorn-zjsvewskw-bsceh technique until ligament was engaged.  The stilette was then removed and a column of preservative free normal saline flushed throught the tuohoy needle and left with a concave fluid level above the butterfly portion of the tuohoy needle.  The needle was then advanced under fluoroscopic guidance until the column of saline disappeared.  Lateral view confirmed final needle tip placement in the epidural space.  After negative aspiration for heme and CSF, 1 cc of Omnipaque confirmed good cervical epiduragram.   Cervical epidurogram showed no evidence of intrathecal or intravascular flow, and good bilateral epidural flow from C3 to T2 levels.  An injectate of 6 cc of preservative free normal saline plus 12 mg of betamethasone was then injected into the epidural space. The needle was subsequently removed and pressure was applied.  The patient tolerated the procedure well and was instructed to contact me immediately if there were any problems.   Rashmi Reyes M.D.

## 2024-05-15 ENCOUNTER — APPOINTMENT (OUTPATIENT)
Dept: PAIN MANAGEMENT | Facility: CLINIC | Age: 44
End: 2024-05-15

## 2024-05-21 ENCOUNTER — APPOINTMENT (OUTPATIENT)
Dept: PAIN MANAGEMENT | Facility: CLINIC | Age: 44
End: 2024-05-21
Payer: COMMERCIAL

## 2024-05-21 VITALS — HEIGHT: 74 IN | BODY MASS INDEX: 26.44 KG/M2 | WEIGHT: 206 LBS

## 2024-05-21 PROCEDURE — 99213 OFFICE O/P EST LOW 20 MIN: CPT

## 2024-05-21 NOTE — HISTORY OF PRESENT ILLNESS
[Neck] : neck [Radiating] : radiating [Leisure] : leisure [Meds] : meds [3] : 3 [Dull/Aching] : dull/aching [Tightness] : tightness [Intermittent] : intermittent [Injection therapy] : injection therapy [FreeTextEntry1] : 05/21/2024: follow up today for DARCIE on 4/9 with relief for one day.  Went to PT and then felt more pain.  Would recommend another DARCIE.  Would try TPI.    04/04/2024 : Patient presents for initial evaluation. He is having pain on his neck and the pain to his shoulders. Had pain by the collar bone for the last 4 months or so. He remembers waking up with neck pain that had resolved initially. He then had difficulty getting comfortable sleeping at night.  No radicular pain.  Physical therapist told him it was a thoracic nerve and he needs to stretch.   Subjective Weakness: No   Numbness/Tingling:  Left fingers Bladder/Bowel dysfunction: No   Treatments Tried:  Physical therapy, acupuncture, massages.  Attempted modalities for current pain complaint:  See above:    Medications: Yes   Injections: No    DARCIE 4/9/24 Previous Spine Surgery: No Imaging:  MRI Cervical Spine (3/18/24) - Very minimal retrolisthesis at C4-C5. Minimal-to-mild spondylosis as noted. Small central disc protrusion at C2-C3. Moderate central and right-sided disc protrusion at C4-C5, encroaching on the right C5 nerve root. [] : Post Surgical Visit: no [FreeTextEntry7] : shoulders  [de-identified] : LOOKING DOWN  [de-identified] : L MRI

## 2024-05-21 NOTE — PROCEDURE
[Left] : of the left [Thoracic paraspinal muscle] : thoracic paraspinal muscle [Trapezius muscle] : trapezius muscle

## 2024-06-04 ENCOUNTER — APPOINTMENT (OUTPATIENT)
Dept: PAIN MANAGEMENT | Facility: CLINIC | Age: 44
End: 2024-06-04
Payer: COMMERCIAL

## 2024-06-04 PROCEDURE — 62321 NJX INTERLAMINAR CRV/THRC: CPT

## 2024-06-04 NOTE — PROCEDURE
[FreeTextEntry3] : Date of Service: 06/04/2024   Account: [id number]   Patient: KASI LUZ   YOB: 1980   Age: 43 year     Surgeon:                                                      Rashmi Reyes M.D.   Pre-Operative Diagnosis:                          Cervical Radiculopathy (M54.12)   Post Operative Diagnosis:                        Cervical Radiculopathy (M54.12)   Procedure:                                                  Interlaminar cervical epidural steroid injection (C7-T1) under fluoroscopic guidance   Anesthesia:                                                 None     This procedure was carried out using fluoroscopic guidance.  The risks and benefits of the procedure were discussed extensively with the patient.  The consent of the patient was obtained and the following procedure was performed.   The patient was placed in the prone position using a thoracic and chin support.  The cervical area was prepped and draped in a sterile fashion.  The fluoroscope visualized the C7-T1 interspace using slight cephalad-caudad angulation and this area was marked.  Using sterile technique the superficial skin was anesthetized with 1% Lidocaine without epinephrine.  A 18 gauge Tuohoy needle was advanced under fluoroscopy using cvrwp-[id number]-feqbi technique until ligament was engaged.  The stilette was then removed and a column of preservative free normal saline flushed throught the tuohoy needle and left with a concave fluid level above the butterfly portion of the tuohoy needle.  The needle was then advanced under fluoroscopic guidance until the column of saline disappeared.  Lateral view confirmed final needle tip placement in the epidural space.  After negative aspiration for heme and CSF, 1 cc of Omnipaque confirmed good cervical epiduragram.   Cervical epidurogram showed no evidence of intrathecal or intravascular flow, and good bilateral epidural flow from C3 to T2 levels.  An injectate of 6 cc of preservative free normal saline plus 12 mg of betamethasone was then injected into the epidural space. The needle was subsequently removed and pressure was applied.  The patient tolerated the procedure well and was instructed to contact me immediately if there were any problems.   Rashmi Reyes M.D.

## 2024-06-25 ENCOUNTER — APPOINTMENT (OUTPATIENT)
Dept: PAIN MANAGEMENT | Facility: CLINIC | Age: 44
End: 2024-06-25

## 2024-06-25 VITALS — BODY MASS INDEX: 26.31 KG/M2 | HEIGHT: 74 IN | WEIGHT: 205 LBS

## 2024-06-25 DIAGNOSIS — M25.512 PAIN IN LEFT SHOULDER: ICD-10-CM

## 2024-06-25 DIAGNOSIS — M54.12 RADICULOPATHY, CERVICAL REGION: ICD-10-CM

## 2024-06-25 PROCEDURE — 99213 OFFICE O/P EST LOW 20 MIN: CPT

## 2024-06-25 RX ORDER — DICLOFENAC SODIUM 75 MG/1
75 TABLET, DELAYED RELEASE ORAL
Qty: 60 | Refills: 0 | Status: ACTIVE | COMMUNITY
Start: 2024-06-25 | End: 1900-01-01

## 2024-09-25 ENCOUNTER — APPOINTMENT (OUTPATIENT)
Dept: ORTHOPEDIC SURGERY | Facility: CLINIC | Age: 44
End: 2024-09-25
Payer: COMMERCIAL

## 2024-09-25 VITALS — BODY MASS INDEX: 26.31 KG/M2 | HEIGHT: 74 IN | WEIGHT: 205 LBS

## 2024-09-25 DIAGNOSIS — M23.91 UNSPECIFIED INTERNAL DERANGEMENT OF RIGHT KNEE: ICD-10-CM

## 2024-09-25 PROCEDURE — 99213 OFFICE O/P EST LOW 20 MIN: CPT | Mod: 25

## 2024-09-25 PROCEDURE — 99214 OFFICE O/P EST MOD 30 MIN: CPT | Mod: 25

## 2024-09-25 PROCEDURE — 73564 X-RAY EXAM KNEE 4 OR MORE: CPT | Mod: RT

## 2024-09-25 NOTE — PHYSICAL EXAM
[Right] : right knee [NL (140)] : flexion 140 degrees [NL (0)] : extension 0 degrees [5___] : hamstring 5[unfilled]/5 [Positive] : positive Marian [] : non-antalgic

## 2024-09-25 NOTE — HISTORY OF PRESENT ILLNESS
[de-identified] : 09/25/2024 Mr. 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 States unable to run, and feels pain under distal patella. Some pain with getting out of car. Avoiding TM and squats/lunges at gym. Sense of instability and episodes of buckling. Pain especially with twisting motions. Denies LBP or n/t.  PMHx: C4-C5 bulging disc, had epidurals done 6/25/24.  L knee meniscectomy 1/2023 w/ Dr Britton

## 2024-09-25 NOTE — DISCUSSION/SUMMARY
[de-identified] : 42yo male with R knee pain since march  1) concerning for possible MMT due to positive farshad and episodes of instability - will get MRI to eval 2) rest, activity modification, NSAIDs for pain  3) RTC after MRI to review results

## 2024-10-02 ENCOUNTER — APPOINTMENT (OUTPATIENT)
Dept: MRI IMAGING | Facility: CLINIC | Age: 44
End: 2024-10-02
Payer: COMMERCIAL

## 2024-10-02 PROCEDURE — 73721 MRI JNT OF LWR EXTRE W/O DYE: CPT | Mod: RT

## 2024-10-16 ENCOUNTER — APPOINTMENT (OUTPATIENT)
Dept: ORTHOPEDIC SURGERY | Facility: CLINIC | Age: 44
End: 2024-10-16
Payer: COMMERCIAL

## 2024-10-16 VITALS — BODY MASS INDEX: 26.31 KG/M2 | WEIGHT: 205 LBS | HEIGHT: 74 IN

## 2024-10-16 DIAGNOSIS — S83.231A COMPLEX TEAR OF MEDIAL MENISCUS, CURRENT INJURY, RIGHT KNEE, INITIAL ENCOUNTER: ICD-10-CM

## 2024-10-16 PROCEDURE — 99214 OFFICE O/P EST MOD 30 MIN: CPT

## 2025-02-10 ENCOUNTER — APPOINTMENT (OUTPATIENT)
Dept: ORTHOPEDIC SURGERY | Facility: AMBULATORY SURGERY CENTER | Age: 45
End: 2025-02-10

## 2025-02-10 DIAGNOSIS — M23.91 UNSPECIFIED INTERNAL DERANGEMENT OF RIGHT KNEE: ICD-10-CM

## 2025-02-10 PROCEDURE — 29881 ARTHRS KNE SRG MNISECTMY M/L: CPT | Mod: RT

## 2025-02-10 PROCEDURE — 29881 ARTHRS KNE SRG MNISECTMY M/L: CPT | Mod: AS,RT

## 2025-02-10 RX ORDER — HYDROCODONE BITARTRATE AND ACETAMINOPHEN 5; 325 MG/1; MG/1
5-325 TABLET ORAL EVERY 6 HOURS
Qty: 10 | Refills: 0 | Status: ACTIVE | COMMUNITY
Start: 2025-02-10 | End: 1900-01-01

## 2025-02-10 RX ORDER — ASPIRIN 325 MG/1
325 TABLET, FILM COATED ORAL DAILY
Qty: 14 | Refills: 0 | Status: ACTIVE | COMMUNITY
Start: 2025-02-10 | End: 1900-01-01

## 2025-02-19 ENCOUNTER — APPOINTMENT (OUTPATIENT)
Dept: ORTHOPEDIC SURGERY | Facility: CLINIC | Age: 45
End: 2025-02-19
Payer: COMMERCIAL

## 2025-02-19 VITALS — HEIGHT: 74 IN | WEIGHT: 205 LBS | BODY MASS INDEX: 26.31 KG/M2

## 2025-02-19 DIAGNOSIS — Z98.890 OTHER SPECIFIED POSTPROCEDURAL STATES: ICD-10-CM

## 2025-02-19 PROCEDURE — 99024 POSTOP FOLLOW-UP VISIT: CPT

## 2025-02-21 ENCOUNTER — APPOINTMENT (OUTPATIENT)
Dept: ULTRASOUND IMAGING | Facility: CLINIC | Age: 45
End: 2025-02-21

## 2025-02-24 ENCOUNTER — OUTPATIENT (OUTPATIENT)
Dept: OUTPATIENT SERVICES | Facility: HOSPITAL | Age: 45
LOS: 1 days | End: 2025-02-24
Payer: COMMERCIAL

## 2025-02-24 ENCOUNTER — RESULT REVIEW (OUTPATIENT)
Age: 45
End: 2025-02-24

## 2025-02-24 ENCOUNTER — APPOINTMENT (OUTPATIENT)
Dept: ULTRASOUND IMAGING | Facility: CLINIC | Age: 45
End: 2025-02-24
Payer: COMMERCIAL

## 2025-02-24 DIAGNOSIS — Z98.890 OTHER SPECIFIED POSTPROCEDURAL STATES: ICD-10-CM

## 2025-02-24 PROCEDURE — 93971 EXTREMITY STUDY: CPT

## 2025-02-24 PROCEDURE — 93971 EXTREMITY STUDY: CPT | Mod: 26,RT
